# Patient Record
Sex: MALE | Race: WHITE | NOT HISPANIC OR LATINO | Employment: OTHER | URBAN - METROPOLITAN AREA
[De-identification: names, ages, dates, MRNs, and addresses within clinical notes are randomized per-mention and may not be internally consistent; named-entity substitution may affect disease eponyms.]

---

## 2023-04-04 DIAGNOSIS — E11.9 TYPE 2 DIABETES MELLITUS WITHOUT COMPLICATION, WITHOUT LONG-TERM CURRENT USE OF INSULIN (MULTI): Primary | ICD-10-CM

## 2023-04-04 NOTE — TELEPHONE ENCOUNTER
Pt called Rx line @ 346 on 4/3 asking for a refill on pended Rx  Need to call pt and get clarification on where med needs sent, pt stated a [facility] but I cannot find in chart. Thanks, AM

## 2023-04-11 ENCOUNTER — TELEPHONE (OUTPATIENT)
Dept: PRIMARY CARE | Facility: CLINIC | Age: 78
End: 2023-04-11
Payer: MEDICARE

## 2023-04-11 NOTE — TELEPHONE ENCOUNTER
Pt told pharmacy he lost his Jardiance 25 MG rx... pharmacy told him to refill rx they need a verbal order to do so. Pt will not call [facility] to refill rx.    P: [phone]

## 2023-04-11 NOTE — TELEPHONE ENCOUNTER
Called [facility] - on hold for 15 mins... had to hang up.. this is exactly why we do not do this.... JOHANNA

## 2023-04-30 LAB — SARS-COV-2 RESULT: NOT DETECTED

## 2023-05-01 ENCOUNTER — HOSPITAL ENCOUNTER (OUTPATIENT)
Dept: DATA CONVERSION | Facility: HOSPITAL | Age: 78
Discharge: HOME | End: 2023-05-02
Attending: STUDENT IN AN ORGANIZED HEALTH CARE EDUCATION/TRAINING PROGRAM | Admitting: STUDENT IN AN ORGANIZED HEALTH CARE EDUCATION/TRAINING PROGRAM

## 2023-05-01 DIAGNOSIS — Z79.4 LONG TERM (CURRENT) USE OF INSULIN (MULTI): ICD-10-CM

## 2023-05-01 DIAGNOSIS — E78.5 HYPERLIPIDEMIA, UNSPECIFIED: ICD-10-CM

## 2023-05-01 DIAGNOSIS — M19.011 PRIMARY OSTEOARTHRITIS, RIGHT SHOULDER: ICD-10-CM

## 2023-05-01 DIAGNOSIS — E11.9 TYPE 2 DIABETES MELLITUS WITHOUT COMPLICATIONS (MULTI): ICD-10-CM

## 2023-05-01 DIAGNOSIS — Z79.82 LONG TERM (CURRENT) USE OF ASPIRIN: ICD-10-CM

## 2023-05-01 DIAGNOSIS — M25.711 OSTEOPHYTE, RIGHT SHOULDER: ICD-10-CM

## 2023-05-01 DIAGNOSIS — Z79.84 LONG TERM (CURRENT) USE OF ORAL HYPOGLYCEMIC DRUGS: ICD-10-CM

## 2023-05-01 DIAGNOSIS — I10 ESSENTIAL (PRIMARY) HYPERTENSION: ICD-10-CM

## 2023-05-01 DIAGNOSIS — M25.511 PAIN IN RIGHT SHOULDER: ICD-10-CM

## 2023-05-01 LAB
POCT GLUCOSE: 104 MG/DL (ref 74–99)
POCT GLUCOSE: 114 MG/DL (ref 74–99)
POCT GLUCOSE: 154 MG/DL (ref 74–99)
POCT GLUCOSE: 251 MG/DL (ref 74–99)

## 2023-05-02 LAB
ANION GAP IN SER/PLAS: 15 MMOL/L (ref 10–20)
CALCIUM (MG/DL) IN SER/PLAS: 8.5 MG/DL (ref 8.6–10.3)
CARBON DIOXIDE, TOTAL (MMOL/L) IN SER/PLAS: 23 MMOL/L (ref 21–32)
CHLORIDE (MMOL/L) IN SER/PLAS: 102 MMOL/L (ref 98–107)
CREATININE (MG/DL) IN SER/PLAS: 1.2 MG/DL (ref 0.5–1.3)
ERYTHROCYTE DISTRIBUTION WIDTH (RATIO) BY AUTOMATED COUNT: 13.2 % (ref 11.5–14.5)
ERYTHROCYTE MEAN CORPUSCULAR HEMOGLOBIN CONCENTRATION (G/DL) BY AUTOMATED: 32.6 G/DL (ref 32–36)
ERYTHROCYTE MEAN CORPUSCULAR VOLUME (FL) BY AUTOMATED COUNT: 97 FL (ref 80–100)
ERYTHROCYTES (10*6/UL) IN BLOOD BY AUTOMATED COUNT: 3.99 X10E12/L (ref 4.5–5.9)
GFR MALE: 62 ML/MIN/1.73M2
GLUCOSE (MG/DL) IN SER/PLAS: 196 MG/DL (ref 74–99)
HEMATOCRIT (%) IN BLOOD BY AUTOMATED COUNT: 38.6 % (ref 41–52)
HEMOGLOBIN (G/DL) IN BLOOD: 12.6 G/DL (ref 13.5–17.5)
LEUKOCYTES (10*3/UL) IN BLOOD BY AUTOMATED COUNT: 10 X10E9/L (ref 4.4–11.3)
PLATELETS (10*3/UL) IN BLOOD AUTOMATED COUNT: 207 X10E9/L (ref 150–450)
POCT GLUCOSE: 212 MG/DL (ref 74–99)
POTASSIUM (MMOL/L) IN SER/PLAS: 3.4 MMOL/L (ref 3.5–5.3)
SODIUM (MMOL/L) IN SER/PLAS: 137 MMOL/L (ref 136–145)
UREA NITROGEN (MG/DL) IN SER/PLAS: 31 MG/DL (ref 6–23)

## 2023-05-05 DIAGNOSIS — Z79.4 TYPE 2 DIABETES MELLITUS WITH HYPERGLYCEMIA, WITH LONG-TERM CURRENT USE OF INSULIN (MULTI): Primary | ICD-10-CM

## 2023-05-05 DIAGNOSIS — E11.65 TYPE 2 DIABETES MELLITUS WITH HYPERGLYCEMIA, WITH LONG-TERM CURRENT USE OF INSULIN (MULTI): Primary | ICD-10-CM

## 2023-05-05 RX ORDER — PEN NEEDLE, DIABETIC 29 G X1/2"
NEEDLE, DISPOSABLE MISCELLANEOUS
COMMUNITY
Start: 2021-02-17 | End: 2023-05-05 | Stop reason: SDUPTHER

## 2023-05-06 RX ORDER — PEN NEEDLE, DIABETIC 29 G X1/2"
NEEDLE, DISPOSABLE MISCELLANEOUS
Qty: 100 EACH | Refills: 3 | Status: SHIPPED | OUTPATIENT
Start: 2023-05-06 | End: 2023-07-17 | Stop reason: SDUPTHER

## 2023-05-09 ENCOUNTER — APPOINTMENT (OUTPATIENT)
Dept: PRIMARY CARE | Facility: CLINIC | Age: 78
End: 2023-05-09
Payer: MEDICARE

## 2023-05-23 ENCOUNTER — OFFICE VISIT (OUTPATIENT)
Dept: PRIMARY CARE | Facility: CLINIC | Age: 78
End: 2023-05-23
Payer: MEDICARE

## 2023-05-23 VITALS
DIASTOLIC BLOOD PRESSURE: 80 MMHG | TEMPERATURE: 97.1 F | SYSTOLIC BLOOD PRESSURE: 136 MMHG | WEIGHT: 195 LBS | HEART RATE: 76 BPM | HEIGHT: 63 IN | BODY MASS INDEX: 34.55 KG/M2 | OXYGEN SATURATION: 98 %

## 2023-05-23 DIAGNOSIS — I10 PRIMARY HYPERTENSION: ICD-10-CM

## 2023-05-23 DIAGNOSIS — E78.2 MIXED HYPERLIPIDEMIA: ICD-10-CM

## 2023-05-23 DIAGNOSIS — Z00.00 ROUTINE ADULT HEALTH MAINTENANCE: ICD-10-CM

## 2023-05-23 DIAGNOSIS — E11.9 TYPE 2 DIABETES MELLITUS WITHOUT COMPLICATION, WITHOUT LONG-TERM CURRENT USE OF INSULIN (MULTI): ICD-10-CM

## 2023-05-23 DIAGNOSIS — F33.42 RECURRENT MAJOR DEPRESSIVE DISORDER, IN FULL REMISSION (CMS-HCC): ICD-10-CM

## 2023-05-23 DIAGNOSIS — E11.9 DIABETES MELLITUS WITHOUT COMPLICATION (MULTI): ICD-10-CM

## 2023-05-23 DIAGNOSIS — Z00.00 MEDICARE ANNUAL WELLNESS VISIT, SUBSEQUENT: Primary | ICD-10-CM

## 2023-05-23 PROCEDURE — 3075F SYST BP GE 130 - 139MM HG: CPT | Performed by: FAMILY MEDICINE

## 2023-05-23 PROCEDURE — 3079F DIAST BP 80-89 MM HG: CPT | Performed by: FAMILY MEDICINE

## 2023-05-23 PROCEDURE — 1036F TOBACCO NON-USER: CPT | Performed by: FAMILY MEDICINE

## 2023-05-23 PROCEDURE — G0439 PPPS, SUBSEQ VISIT: HCPCS | Performed by: FAMILY MEDICINE

## 2023-05-23 PROCEDURE — 1159F MED LIST DOCD IN RCRD: CPT | Performed by: FAMILY MEDICINE

## 2023-05-23 PROCEDURE — 99214 OFFICE O/P EST MOD 30 MIN: CPT | Performed by: FAMILY MEDICINE

## 2023-05-23 PROCEDURE — 1170F FXNL STATUS ASSESSED: CPT | Performed by: FAMILY MEDICINE

## 2023-05-23 PROCEDURE — 1160F RVW MEDS BY RX/DR IN RCRD: CPT | Performed by: FAMILY MEDICINE

## 2023-05-23 RX ORDER — LOSARTAN POTASSIUM AND HYDROCHLOROTHIAZIDE 25; 100 MG/1; MG/1
TABLET ORAL
COMMUNITY
Start: 2011-03-17 | End: 2023-05-23 | Stop reason: SDUPTHER

## 2023-05-23 RX ORDER — METFORMIN HYDROCHLORIDE 500 MG/1
500 TABLET ORAL
Qty: 180 TABLET | Refills: 3 | Status: SHIPPED | OUTPATIENT
Start: 2023-05-23 | End: 2023-07-18 | Stop reason: SDUPTHER

## 2023-05-23 RX ORDER — LATANOPROST 50 UG/ML
SOLUTION/ DROPS OPHTHALMIC
COMMUNITY
Start: 2022-02-17

## 2023-05-23 RX ORDER — LOVASTATIN 40 MG/1
TABLET ORAL
COMMUNITY
Start: 2008-11-07 | End: 2023-05-23 | Stop reason: SDUPTHER

## 2023-05-23 RX ORDER — LOVASTATIN 40 MG/1
40 TABLET ORAL NIGHTLY
Qty: 90 TABLET | Refills: 3 | Status: SHIPPED | OUTPATIENT
Start: 2023-05-23 | End: 2023-12-18 | Stop reason: SDUPTHER

## 2023-05-23 RX ORDER — POTASSIUM CHLORIDE 750 MG/1
CAPSULE, EXTENDED RELEASE ORAL
COMMUNITY
Start: 2021-08-05 | End: 2023-05-23 | Stop reason: SDUPTHER

## 2023-05-23 RX ORDER — LOSARTAN POTASSIUM AND HYDROCHLOROTHIAZIDE 25; 100 MG/1; MG/1
1 TABLET ORAL DAILY
Qty: 90 TABLET | Refills: 3 | Status: SHIPPED | OUTPATIENT
Start: 2023-05-23 | End: 2023-08-17 | Stop reason: SDUPTHER

## 2023-05-23 RX ORDER — AMLODIPINE BESYLATE 10 MG/1
10 TABLET ORAL DAILY
Qty: 90 TABLET | Refills: 3 | Status: SHIPPED | OUTPATIENT
Start: 2023-05-23 | End: 2023-07-18 | Stop reason: SDUPTHER

## 2023-05-23 RX ORDER — METFORMIN HYDROCHLORIDE 500 MG/1
TABLET ORAL
COMMUNITY
Start: 2016-02-11 | End: 2023-05-23 | Stop reason: SDUPTHER

## 2023-05-23 RX ORDER — METOPROLOL TARTRATE 50 MG/1
50 TABLET ORAL 2 TIMES DAILY
Qty: 180 TABLET | Refills: 3 | Status: SHIPPED | OUTPATIENT
Start: 2023-05-23 | End: 2023-07-18 | Stop reason: SDUPTHER

## 2023-05-23 RX ORDER — AMLODIPINE BESYLATE 10 MG/1
TABLET ORAL
COMMUNITY
Start: 2013-12-06 | End: 2023-05-23 | Stop reason: SDUPTHER

## 2023-05-23 RX ORDER — TIMOLOL MALEATE 6.8 MG/ML
SOLUTION/ DROPS OPHTHALMIC
COMMUNITY
Start: 2023-01-10

## 2023-05-23 RX ORDER — DULOXETIN HYDROCHLORIDE 60 MG/1
60 CAPSULE, DELAYED RELEASE ORAL DAILY
Qty: 90 CAPSULE | Refills: 3 | Status: SHIPPED | OUTPATIENT
Start: 2023-05-23 | End: 2023-11-08 | Stop reason: SDUPTHER

## 2023-05-23 RX ORDER — PEN NEEDLE, DIABETIC 32GX 5/32"
NEEDLE, DISPOSABLE MISCELLANEOUS
COMMUNITY
Start: 2023-05-08 | End: 2024-04-30

## 2023-05-23 RX ORDER — DULOXETIN HYDROCHLORIDE 60 MG/1
CAPSULE, DELAYED RELEASE ORAL
COMMUNITY
Start: 2017-06-22 | End: 2023-05-23 | Stop reason: SDUPTHER

## 2023-05-23 RX ORDER — POTASSIUM CHLORIDE 750 MG/1
10 CAPSULE, EXTENDED RELEASE ORAL DAILY
Qty: 90 CAPSULE | Refills: 3 | Status: SHIPPED | OUTPATIENT
Start: 2023-05-23 | End: 2024-05-16 | Stop reason: SDUPTHER

## 2023-05-23 RX ORDER — BUPROPION HYDROCHLORIDE 150 MG/1
150 TABLET ORAL EVERY MORNING
Qty: 90 TABLET | Refills: 1 | Status: SHIPPED | OUTPATIENT
Start: 2023-05-23 | End: 2023-07-22

## 2023-05-23 RX ORDER — METOPROLOL TARTRATE 50 MG/1
TABLET ORAL
COMMUNITY
Start: 2013-12-06 | End: 2023-05-23 | Stop reason: SDUPTHER

## 2023-05-23 RX ORDER — FAMOTIDINE 20 MG/1
TABLET, FILM COATED ORAL
COMMUNITY
Start: 2020-02-06

## 2023-05-23 RX ORDER — HUMAN INSULIN 100 [IU]/ML
INJECTION, SUSPENSION SUBCUTANEOUS
COMMUNITY
End: 2023-07-10 | Stop reason: SDUPTHER

## 2023-05-23 ASSESSMENT — PATIENT HEALTH QUESTIONNAIRE - PHQ9
1. LITTLE INTEREST OR PLEASURE IN DOING THINGS: SEVERAL DAYS
10. IF YOU CHECKED OFF ANY PROBLEMS, HOW DIFFICULT HAVE THESE PROBLEMS MADE IT FOR YOU TO DO YOUR WORK, TAKE CARE OF THINGS AT HOME, OR GET ALONG WITH OTHER PEOPLE: NOT DIFFICULT AT ALL
SUM OF ALL RESPONSES TO PHQ9 QUESTIONS 1 AND 2: 2
2. FEELING DOWN, DEPRESSED OR HOPELESS: SEVERAL DAYS

## 2023-05-23 ASSESSMENT — ACTIVITIES OF DAILY LIVING (ADL)
GROCERY_SHOPPING: INDEPENDENT
DOING_HOUSEWORK: INDEPENDENT
BATHING: INDEPENDENT
MANAGING_FINANCES: INDEPENDENT
TAKING_MEDICATION: INDEPENDENT
DRESSING: INDEPENDENT

## 2023-05-23 NOTE — PROGRESS NOTES
Subjective   Patient ID: Saturnino Escobedo is a 77 y.o. male who presents for Medicare Annual Wellness Visit Subsequent (Recheck dm/Review bw).  HPI  HPI: Annual Wellness visit. The patient has felt depressed over the past 6 weeks. No trouble with bathing, dressing, eating, grooming, walking, toileting, house work, managing money or transportation. Reports no falls. The home has functioning smoke alarms, grab bars in the bathroom and handrails on the stairs. The home does not have poor lighting. Reports hearing difficulty in the ears bilaterally. Currently following a diabetic/low calorie diet.   Has had some urinary incontinence.    1. lipids: well controlled    2. anxiety: doing well overall.  Feeling more depressed and disappointed.    3. HTN: weight is good, stable    4. DM2: Trying to watch his diet.  A1c was 6.7 in April.    5. OA: stable      Counseled pt on living will: has living will    AAA screening: NA     Prostate CA screen:     CV screening: checking CA score    Colonoscopy: done in 2021 and normal    Diabetes screening: treated    Glaucoma screen:  sees optho    CT chest tob screen: NA    Vaccines:      Current Outpatient Medications on File Prior to Visit   Medication Sig Dispense Refill    amLODIPine (Norvasc) 10 mg tablet Take by mouth.      aspirin 81 mg capsule Take by mouth.      DULoxetine (Cymbalta) 60 mg DR capsule Take by mouth.      empagliflozin (Jardiance) 25 mg Take 1 tablet (25 mg) by mouth once daily. 90 tablet 3    fish oil concentrate (Omega-3) 120-180 mg capsule       latanoprost (Xalatan) 0.005 % ophthalmic solution Administer into affected eye(s).      losartan-hydrochlorothiazide (Hyzaar) 100-25 mg tablet Take by mouth.      lovastatin (Mevacor) 40 mg tablet Take by mouth.      metFORMIN (Glucophage) 500 mg tablet Take by mouth.      metoprolol tartrate (Lopressor) 50 mg tablet Take by mouth.      NovoLIN N FlexPen 100 unit/mL (3 mL) injection INJECT 20 UNITS SUBCUTANEOUSLY TWICE  "DAILY      pen needle, diabetic 31 gauge x 1/4\" needle To use daily 100 each 3    timoloL maleate 0.5 % drops, once daily Administer into affected eye(s).      famotidine (Pepcid) 20 mg tablet Take by mouth.      pen needle, diabetic 31 gauge x 15/64\" needle USE AS DIRECTED DAILY      potassium chloride ER (Micro-K) 10 mEq ER capsule Take by mouth.       No current facility-administered medications on file prior to visit.        Vitals:    05/23/23 1010   BP: 136/80   Pulse: 76   Temp: 36.2 °C (97.1 °F)   SpO2: 98%       Review of Systems   All other systems reviewed and are negative.      Objective     Physical Exam  Vitals reviewed.   Constitutional:       General: He is not in acute distress.     Appearance: Normal appearance. He is well-developed. He is not diaphoretic.   HENT:      Head: Normocephalic and atraumatic.      Right Ear: Tympanic membrane normal.      Left Ear: Tympanic membrane normal.      Nose: Nose normal.      Mouth/Throat:      Mouth: Mucous membranes are moist.   Eyes:      Pupils: Pupils are equal, round, and reactive to light.   Cardiovascular:      Rate and Rhythm: Normal rate and regular rhythm.      Heart sounds: Normal heart sounds. No murmur heard.     No friction rub. No gallop.   Pulmonary:      Effort: Pulmonary effort is normal.      Breath sounds: Normal breath sounds. No rales.   Abdominal:      General: Bowel sounds are normal.      Palpations: Abdomen is soft.      Tenderness: There is no abdominal tenderness.   Musculoskeletal:      Cervical back: Normal range of motion and neck supple.   Skin:     General: Skin is warm and dry.   Neurological:      Mental Status: He is alert.   Psychiatric:         Mood and Affect: Mood normal.         Orders Only on 04/22/2023   Component Date Value Ref Range Status    SARS-COV-2 RESULT 04/29/2023 NOT DETECTED  Not Detected Final    Comment: .  This assay is designed to detect the ORF1a/b and E genes of SARS-CoV-2 via   nucleic acid " amplification. A Not Detected result does not preclude   2019-nCoV infection since the adequacy of sample collection and/or low viral   burden may result in presence of viral nucleic acids below the clinical   sensitivity of this test method.     Fact sheet for providers: https://www.fda.gov/media/789622/download   Fact sheet for patients: https://www.fda.gov/media/684011/download     This test has received FDA Emergency Use Authorization (EUA) and has been   verified for use by [facility] ([facility]).   This test is only authorized for the duration of time that circumstances   exist to justify the authorization of the emergency use of in vitro   diagnostic tests for the detection of SARS-CoV-2 virus and/or diagnosis of   COVID-19 infection under section 564(b)(1) of the Act, 21 U.S.C.   360bbb-3(b)(1), unless the authorization is terminated or revoked sooner.                               [facility] is certified under CLIA-88 as   qualified to perform high complexity testing. Testing is performed in the   [facility] laboratories located at [address] [place] [address].     Legacy Encounter on 04/21/2023   Component Date Value Ref Range Status    Glucose 04/21/2023 108 (H)  74 - 99 mg/dL Final    Sodium 04/21/2023 138  136 - 145 mmol/L Final    Potassium 04/21/2023 3.9  3.5 - 5.3 mmol/L Final    Chloride 04/21/2023 101  98 - 107 mmol/L Final    Bicarbonate 04/21/2023 27  21 - 32 mmol/L Final    Anion Gap 04/21/2023 14  10 - 20 mmol/L Final    Urea Nitrogen 04/21/2023 26 (H)  6 - 23 mg/dL Final    Creatinine 04/21/2023 1.27  0.50 - 1.30 mg/dL Final    GFR MALE 04/21/2023 58 (A)  >90 mL/min/1.73m2 Final    Comment:  CALCULATIONS OF ESTIMATED GFR ARE PERFORMED   USING THE 2021 CKD-EPI STUDY REFIT EQUATION   WITHOUT THE RACE VARIABLE FOR THE IDMS-TRACEABLE   CREATININE METHODS.    https://jasn.asnjournals.org/content/early/2021/09/22/ASN.6501112160       Calcium 04/21/2023 9.2  8.6 - 10.3 mg/dL Final    Hemoglobin A1C 04/21/2023 6.7 (A)  % Final    Comment:      Diagnosis of Diabetes-Adults   Non-Diabetic: < or = 5.6%   Increased risk for developing diabetes: 5.7-6.4%   Diagnostic of diabetes: > or = 6.5%  .       Monitoring of Diabetes                Age (y)     Therapeutic Goal (%)   Adults:          >18           <7.0   Pediatrics:    13-18           <7.5                   7-12           <8.0                   0- 6            7.5-8.5   American Diabetes Association. Diabetes Care 33(S1), Jan 2010.      Estimated Average Glucose 04/21/2023 146  MG/DL Final    WBC 04/21/2023 7.0  4.4 - 11.3 x10E9/L Final    RBC 04/21/2023 4.62  4.50 - 5.90 x10E12/L Final    Hemoglobin 04/21/2023 14.8  13.5 - 17.5 g/dL Final    Hematocrit 04/21/2023 45.1  41.0 - 52.0 % Final    MCV 04/21/2023 98  80 - 100 fL Final    MCHC 04/21/2023 32.8  32.0 - 36.0 g/dL Final    Platelets 04/21/2023 233  150 - 450 x10E9/L Final    RDW 04/21/2023 13.0  11.5 - 14.5 % Final    Neutrophils % 04/21/2023 52.5  40.0 - 80.0 % Final    Immature Granulocytes %, Automated 04/21/2023 0.1  0.0 - 0.9 % Final    Comment:  Immature Granulocyte Count (IG) includes promyelocytes,    myelocytes and metamyelocytes but does not include bands.   Percent differential counts (%) should be interpreted in the   context of the absolute cell counts (cells/L).      Lymphocytes % 04/21/2023 34.9  13.0 - 44.0 % Final    Monocytes % 04/21/2023 10.8  2.0 - 10.0 % Final    Eosinophils % 04/21/2023 1.1  0.0 - 6.0 % Final    Basophils % 04/21/2023 0.6  0.0 - 2.0 % Final    Neutrophils Absolute 04/21/2023 3.69  1.60 - 5.50 x10E9/L Final    Lymphocytes Absolute 04/21/2023 2.46  0.80 - 3.00 x10E9/L Final    Monocytes Absolute 04/21/2023 0.76  0.05 - 0.80 x10E9/L Final    Eosinophils Absolute 04/21/2023 0.08  0.00 - 0.40 x10E9/L Final    Basophils Absolute 04/21/2023 0.04  0.00 - 0.10 x10E9/L Final    Staph/MRSA Screen Culture  04/21/2023 NO Staphylococcus aureus ISOLATED.   Final    Ventricular Rate 04/21/2023 57  BPM Final    Atrial Rate 04/21/2023 57  BPM Final    AK Interval 04/21/2023 166  ms Final    QRS Duration 04/21/2023 100  ms Final    QT Interval 04/21/2023 456  ms Final    QTC Calculation(Bazett) 04/21/2023 443  ms Final    P Axis 04/21/2023 5  degrees Final    R Axis 04/21/2023 -26  degrees Final    T Axis 04/21/2023 19  degrees Final    QRS Count 04/21/2023 9  beats Final    Q Onset 04/21/2023 222  ms Final    P Onset 04/21/2023 139  ms Final    P Offset 04/21/2023 201  ms Final    T Offset 04/21/2023 450  ms Final    QTC Fredericia 04/21/2023 448  ms Final       Assessment/Plan   Problem List Items Addressed This Visit    None  Visit Diagnoses       Medicare annual wellness visit, subsequent    -  Primary    Routine adult health maintenance        Diabetes mellitus without complication (CMS/HCC)        Primary hypertension        Mixed hyperlipidemia        Recurrent major depressive disorder, in full remission (CMS/HCC)              Doing well.  Refilled meds.  Follow up in 4 mo.

## 2023-05-26 ENCOUNTER — TELEPHONE (OUTPATIENT)
Dept: PRIMARY CARE | Facility: CLINIC | Age: 78
End: 2023-05-26
Payer: MEDICARE

## 2023-05-26 NOTE — TELEPHONE ENCOUNTER
Pt wants it put in his chart that [facility] uses [facility] all his [facility] only RX that doesn't use [facility] is Jardiance

## 2023-07-10 DIAGNOSIS — Z79.4 CONTROLLED TYPE 2 DIABETES MELLITUS WITH HYPERGLYCEMIA, WITH LONG-TERM CURRENT USE OF INSULIN (MULTI): Primary | ICD-10-CM

## 2023-07-10 DIAGNOSIS — E11.65 CONTROLLED TYPE 2 DIABETES MELLITUS WITH HYPERGLYCEMIA, WITH LONG-TERM CURRENT USE OF INSULIN (MULTI): Primary | ICD-10-CM

## 2023-07-10 RX ORDER — HUMAN INSULIN 100 [IU]/ML
20 INJECTION, SUSPENSION SUBCUTANEOUS
Qty: 36 ML | Refills: 3 | Status: SHIPPED | OUTPATIENT
Start: 2023-07-10 | End: 2023-07-12 | Stop reason: ALTCHOICE

## 2023-07-12 ENCOUNTER — TELEPHONE (OUTPATIENT)
Dept: PRIMARY CARE | Facility: CLINIC | Age: 78
End: 2023-07-12
Payer: MEDICARE

## 2023-07-12 DIAGNOSIS — Z79.4 CONTROLLED TYPE 2 DIABETES MELLITUS WITH HYPERGLYCEMIA, WITH LONG-TERM CURRENT USE OF INSULIN (MULTI): Primary | ICD-10-CM

## 2023-07-12 DIAGNOSIS — E11.65 CONTROLLED TYPE 2 DIABETES MELLITUS WITH HYPERGLYCEMIA, WITH LONG-TERM CURRENT USE OF INSULIN (MULTI): Primary | ICD-10-CM

## 2023-07-12 RX ORDER — NAPROXEN SODIUM 220 MG
TABLET ORAL
Qty: 200 EACH | Refills: 3 | Status: SHIPPED | OUTPATIENT
Start: 2023-07-12 | End: 2024-04-16 | Stop reason: SDUPTHER

## 2023-07-12 NOTE — TELEPHONE ENCOUNTER
Pt stated that he needs the generic of Novolin sent into [facility] d/t the pice of Novolin being doubled. Can we send in generic version and pt wants called after rx is sent in

## 2023-07-17 ENCOUNTER — TELEPHONE (OUTPATIENT)
Dept: PRIMARY CARE | Facility: CLINIC | Age: 78
End: 2023-07-17
Payer: MEDICARE

## 2023-07-17 DIAGNOSIS — E78.2 MIXED HYPERLIPIDEMIA: ICD-10-CM

## 2023-07-17 DIAGNOSIS — E11.65 CONTROLLED TYPE 2 DIABETES MELLITUS WITH HYPERGLYCEMIA, WITH LONG-TERM CURRENT USE OF INSULIN (MULTI): ICD-10-CM

## 2023-07-17 DIAGNOSIS — E11.65 TYPE 2 DIABETES MELLITUS WITH HYPERGLYCEMIA, WITH LONG-TERM CURRENT USE OF INSULIN (MULTI): ICD-10-CM

## 2023-07-17 DIAGNOSIS — I10 PRIMARY HYPERTENSION: ICD-10-CM

## 2023-07-17 DIAGNOSIS — Z79.4 CONTROLLED TYPE 2 DIABETES MELLITUS WITH HYPERGLYCEMIA, WITH LONG-TERM CURRENT USE OF INSULIN (MULTI): ICD-10-CM

## 2023-07-17 DIAGNOSIS — E11.9 DIABETES MELLITUS WITHOUT COMPLICATION (MULTI): ICD-10-CM

## 2023-07-17 DIAGNOSIS — Z79.4 TYPE 2 DIABETES MELLITUS WITH HYPERGLYCEMIA, WITH LONG-TERM CURRENT USE OF INSULIN (MULTI): ICD-10-CM

## 2023-07-17 RX ORDER — HUMAN INSULIN 100 [IU]/ML
20 INJECTION, SUSPENSION SUBCUTANEOUS
Qty: 36 ML | Refills: 1 | Status: SHIPPED | OUTPATIENT
Start: 2023-07-17 | End: 2023-07-23 | Stop reason: SDUPTHER

## 2023-07-17 RX ORDER — PEN NEEDLE, DIABETIC 29 G X1/2"
NEEDLE, DISPOSABLE MISCELLANEOUS
Qty: 200 EACH | Refills: 1 | Status: SHIPPED | OUTPATIENT
Start: 2023-07-17 | End: 2024-04-22 | Stop reason: SDUPTHER

## 2023-07-17 NOTE — TELEPHONE ENCOUNTER
Pt called stating he does not understand why something different was sent in since he always gets the generic flex pen and wants that sent into [facility] since he will be out.       Pt req generic Flex pen be sent can this be resent please. / mk

## 2023-07-17 NOTE — TELEPHONE ENCOUNTER
Pharmacy states pt usually does not get vials of insulin, asking if this was a mistake or changed? Can another provider address this?

## 2023-07-18 RX ORDER — METOPROLOL TARTRATE 50 MG/1
50 TABLET ORAL 2 TIMES DAILY
Qty: 180 TABLET | Refills: 3 | Status: SHIPPED | OUTPATIENT
Start: 2023-07-18 | End: 2024-07-17

## 2023-07-18 RX ORDER — AMLODIPINE BESYLATE 10 MG/1
10 TABLET ORAL DAILY
Qty: 90 TABLET | Refills: 3 | Status: SHIPPED | OUTPATIENT
Start: 2023-07-18 | End: 2024-07-17

## 2023-07-18 RX ORDER — LOVASTATIN 40 MG/1
TABLET ORAL
Qty: 90 TABLET | Refills: 3 | OUTPATIENT
Start: 2023-07-18

## 2023-07-18 RX ORDER — METFORMIN HYDROCHLORIDE 500 MG/1
500 TABLET ORAL
Qty: 180 TABLET | Refills: 3 | Status: SHIPPED | OUTPATIENT
Start: 2023-07-18 | End: 2024-07-17

## 2023-07-18 NOTE — TELEPHONE ENCOUNTER
Called and spoke with pt. These meds were sent to [facility] for 1 yr supply  These need to go through Mailorder. Pt needs these sent to [facility] today.  Please advise./JEISON

## 2023-07-18 NOTE — TELEPHONE ENCOUNTER
Pt called rx line at 157 pm stating he needed refills on Metoprolol, Metformin and Amlodipine.   All these RX were sent for 90 day supplies with 3 refills on 5/23/23 to [facility] and they confirmed that they received these. Need to reach out to patient to have them check with pharmacy. EVELYN

## 2023-07-23 RX ORDER — HUMAN INSULIN 100 [IU]/ML
20 INJECTION, SUSPENSION SUBCUTANEOUS
Qty: 36 ML | Refills: 1 | Status: SHIPPED | OUTPATIENT
Start: 2023-07-23 | End: 2023-10-24 | Stop reason: SDUPTHER

## 2023-08-17 DIAGNOSIS — I10 PRIMARY HYPERTENSION: ICD-10-CM

## 2023-08-17 RX ORDER — LOSARTAN POTASSIUM AND HYDROCHLOROTHIAZIDE 25; 100 MG/1; MG/1
1 TABLET ORAL DAILY
Qty: 90 TABLET | Refills: 3 | Status: CANCELLED | OUTPATIENT
Start: 2023-08-17 | End: 2024-08-16

## 2023-08-17 NOTE — TELEPHONE ENCOUNTER
Pt called Rx line requesting a refill on Losartan, pt is needing this to go to [facility] mail order pharmacy. Looks like med was sent to [facility] previously. Please advise, AM

## 2023-08-21 RX ORDER — LOSARTAN POTASSIUM AND HYDROCHLOROTHIAZIDE 25; 100 MG/1; MG/1
1 TABLET ORAL DAILY
Qty: 90 TABLET | Refills: 3 | Status: SHIPPED | OUTPATIENT
Start: 2023-08-21 | End: 2024-08-20

## 2023-08-25 PROBLEM — M25.511 RIGHT SHOULDER PAIN: Status: ACTIVE | Noted: 2023-08-25

## 2023-08-25 PROBLEM — E11.9 TYPE 2 DIABETES MELLITUS (MULTI): Status: ACTIVE | Noted: 2022-05-26

## 2023-08-25 PROBLEM — F41.1 ANXIETY STATE: Status: ACTIVE | Noted: 2019-10-10

## 2023-08-25 PROBLEM — M15.9 GENERALIZED OSTEOARTHRITIS: Status: ACTIVE | Noted: 2019-10-10

## 2023-08-25 PROBLEM — M19.011 OSTEOARTHRITIS OF RIGHT GLENOHUMERAL JOINT: Status: ACTIVE | Noted: 2023-08-25

## 2023-08-25 RX ORDER — BACLOFEN 10 MG/1
.5-1 TABLET ORAL 3 TIMES DAILY PRN
COMMUNITY
Start: 2016-06-16

## 2023-08-25 RX ORDER — OXYCODONE HYDROCHLORIDE 5 MG/1
TABLET ORAL
COMMUNITY
Start: 2023-05-01

## 2023-08-25 RX ORDER — CHLORHEXIDINE GLUCONATE ORAL RINSE 1.2 MG/ML
15 SOLUTION DENTAL
COMMUNITY
Start: 2023-04-21

## 2023-08-25 RX ORDER — ASPIRIN 81 MG/1
81 TABLET ORAL DAILY
COMMUNITY

## 2023-08-25 RX ORDER — FLASH GLUCOSE SCANNING READER
EACH MISCELLANEOUS
COMMUNITY
Start: 2021-02-17

## 2023-08-25 RX ORDER — NAPROXEN 250 MG/1
250 TABLET ORAL
COMMUNITY
Start: 2014-10-29

## 2023-08-25 RX ORDER — DORZOLAMIDE HYDROCHLORIDE AND TIMOLOL MALEATE 20; 5 MG/ML; MG/ML
SOLUTION/ DROPS OPHTHALMIC
COMMUNITY
Start: 2022-04-21

## 2023-08-25 RX ORDER — METOPROLOL SUCCINATE 50 MG/1
50 TABLET, EXTENDED RELEASE ORAL DAILY
COMMUNITY

## 2023-08-25 RX ORDER — NAPROXEN 500 MG/1
1 TABLET ORAL 2 TIMES DAILY
COMMUNITY
Start: 2022-06-20

## 2023-08-25 RX ORDER — AMOXICILLIN 500 MG/1
4 CAPSULE ORAL
COMMUNITY
Start: 2023-03-27

## 2023-09-07 VITALS — HEIGHT: 63 IN | WEIGHT: 187.61 LBS | BODY MASS INDEX: 33.24 KG/M2

## 2023-09-14 NOTE — DISCHARGE SUMMARY
Send Summary:   Discharge Summary Providers:  Provider Role Provider Name   · Attending Nick Escalera   · Referring Nick Escalera   · Primary Bud Escalera       Note Recipients: Bud Escalera MD - [id number] [PREFERRED]       Discharge:    Summary:   Admission Date: .01-May-2023 08:19:00   Discharge Date: 02-May-2023   Attending Physician at Discharge: Dr. Escalera   Admission Reason: Right glenohumeral OA   Final Discharge Diagnoses: Right glenohumeral OA   Procedures: Date: 01-May-2023 13:01:00  Procedure Name: 1. Right Shoulder Anatomic Total Replacement   Vital Signs:        T   P  R  BP   MAP  SpO2   Value  36.6  59  17  134/80      97%  Date/Time 5/2 8:08 5/2 8:08 5/2 8:08 5/2 8:08    5/2 8:08  Range  (36.1C - 36.6C )  (59 - 74 )  (17 - 18 )  (118 - 134 )/ (74 - 83 )    (93% - 97% )    Date:            Weight/Scale Type:  Height:   01-May-2023 14:21  85.1  kg / standing  159.7  cm  Physical Exam:    See daily progress note.  Hospital Course:    77 year-old male who presented with right shoulder arthritis. Patient is now s/p R anatomic shoulder arthroplasty on 5/1 by Dr. Escalera. On the day of surgery, patient  was identified in the pre-operative holding area and agreeable to proceed with surgery. Written consent was obtained.  Please see operative note for further details of this procedure. Patient received 24 hours of shaheed-operative antibiotics. Patient recovered  in the PACU before transfer to a regular nursing floor. Patient was started on oxycodone, tylenol, and morphine for pain control and ASA 81mg for DVT prophylaxis. Physical therapy recommended continued recovery in a home setting without needs. On the  day of discharge, patient was afebrile with stable vital signs. Patient was neurovascularly intact at time of discharge. Patient was discharged with prescription of ASA 81mg for DVT prophylaxis for 4 weeks. Patient will follow-up with Dr. Escalera in 2 weeks  for post-operative visit.      Discharge  Information:    and Continuing Care:   Lab Results - Pending:    None  Radiology Results - Pending: None   Discharge Instructions:    Activity:           activity with assistance.          May shower..            In sling, no active motion    Nutrition/Diet:           resume normal diet    Wound Care:           Wound Site:   Right shoulder          Wound Type:   surgical incision          Change Dressing:   Keep surgical dressing in place for 7 days, ok to remove after 7 days and leave open to air if no drainage    Additional Orders:           Additional Instructions:   MEDICATION SIDE EFFECTS.  OXYCODONE: constipation, nausea, vomiting, upset stomach, (sleepiness), dizziness, lightheadedness, itching, headache, blurred vision, dry mouth, sweating  TRAMADOL: headache, dizziness, drowsiness, tired feeling; constipation, diarrhea, nausea, vomiting, stomach pain; feeling nervous or anxious, itching, sweating, flushing.  ASPIRIN:  upset stomach, heartburn; drowsiness; or headache    Call if any drainage after 7 days, increased redness/warmth/swelling at incision site, pain/tenderness of calf, swelling of calf that does not respond to elevation, SOB/chest pain.          Follow Up Appointments:    Follow-Up Appointment 01:           Physician/Dept/Service:   Dr. Escalera          Call to Schedule in:   2 weeks          Phone Number:   [phone]    Discharge Medications: Home Medication   acetaminophen 500 mg oral tablet - 1 tab(s) orally every 6 hours -.Meds to Beds   Colace 100 mg oral capsule - 1 cap(s) orally 2 times a day -.Meds to Beds   oxyCODONE 5 mg oral tablet - 1 tab(s) orally every 6 hours -.Meds to Beds  as needed for pain.   Aspirin Enteric Coated 81 mg oral delayed release tablet - 1 tab(s) orally once a day -.Meds to Beds   NovoLIN 70/30 FlexPen subcutaneous suspension - subcutaneous 2 times a day  Jardiance 25 mg oral tablet - 1 tab(s) orally once a day (in the morning)  latanoprost 0.005% ophthalmic  solution - 1 drop(s) to each affected eye once a day (in the evening)  DULoxetine 60 mg oral delayed release capsule - 1 cap(s) orally once a day  metFORMIN 500 mg oral tablet - 1 tab(s) orally 2 times a day  amLODIPine 10 mg oral tablet - 1 tab(s) orally once a day  metoprolol tartrate 50 mg oral tablet - 1 tab(s) orally 2 times a day  losartan-hydroCHLOROthiazide 100 mg-25 mg oral tablet - 1 tab(s) orally once a day  lovastatin 40 mg oral tablet, extended release - 1 tab(s) orally once a day (at bedtime)  omega 3 - 1   once a day  100mg     PRN Medication     DNR Status:   ·  Code Status Code Status order at time of discharge: Full Code       Electronic Signatures:  Jenna Bernabe (APRN-CNP)  (Signed 02-May-2023 11:17)   Authored: Send Summary, Summary Content, Ongoing Care,  DNR Status, Note Completion      Last Updated: 02-May-2023 11:17 by Jenna Bernabe (APRN-CNP)

## 2023-09-14 NOTE — H&P
History & Physical Reviewed:   I have reviewed the History and Physical dated:  21-Apr-2023   History and Physical reviewed and relevant findings noted. Patient examined to review pertinent physical  findings.: No significant changes   Home Medications Reviewed: no changes noted   Allergies Reviewed: no changes noted       ERAS (Enhanced Recovery After Surgery):  ·  ERAS Patient: no     Consent:   COVID-19 Consent:  ·  COVID-19 Risk Consent Surgeon has reviewed key risks related to the risk of jefferson COVID-19 and if they contract COVID-19 what the risks are.     Attestation:   Note Completion:  I am a:  Resident/Fellow   Attending Attestation I saw and evaluated the patient.  I personally obtained the key and critical portions of the history and physical exam or was physically present for key and  critical portions performed by the resident/fellow. I reviewed the resident/fellow?s documentation and discussed the patient with the resident/fellow.  I agree with the resident/fellow?s medical decision making as documented in the note.     I personally evaluated the patient on 01-May-2023         Electronic Signatures:  Nick Escalera)  (Signed 01-May-2023 09:54)   Authored: Note Completion   Co-Signer: History & Physical Reviewed, ERAS, Consent, Note Completion  Haase, Lucas (MD (Resident))  (Signed 01-May-2023 09:29)   Authored: History & Physical Reviewed, ERAS, Consent,  Note Completion      Last Updated: 01-May-2023 09:54 by Nick Escalera)

## 2023-09-14 NOTE — PROGRESS NOTES
Service: Orthopaedics     Subjective Data:   EUGENE SERVIN is a 77 year old Male who is Hospital Day # 2 and POD #1 for 1. Right Shoulder Anatomic Total Replacement;     No acute events overnight. Patient states his pain is well controlled. Is eager to get home. States he has been OOB and walking. Tolerating a Diabetic diet without N/V. States he is passing gas but denies  having a BM. Denies fever, chills, SOB, and CP.    Objective Data:     Objective Information:      T   P  R  BP   MAP  SpO2   Value  36.6  59  17  134/80      97%  Date/Time 5/2 8:08 5/2 8:08 5/2 8:08 5/2 8:08    5/2 8:08  Range  (36.1C - 36.6C )  (59 - 74 )  (17 - 18 )  (118 - 134 )/ (74 - 83 )    (93% - 97% )      Pain reported at 5/2 4:00: sleeping    ---- Intake and Output  -----  Mn/Dy/Year Time  Intake   Output  Net  May 2, 2023 6:00 am  400   0  400  May 1, 2023 10:00 pm  0   0  0  May 1, 2023 2:00 pm  944   50  894    The Intake and Output Totals for the last 24 hours are:      Intake   Output  Net      1344   50  1294    Physical Exam by System     Constitutional: Awake/alert/oriented x3, no distress,  alert and cooperative.   Eyes: EOMI, clear sclera.   ENMT: Mucous membranes moist, no apparent injury.   Head/Neck: Neck supple, no apparent injury.   Respiratory/Thorax: Patent airways, CTAB, normal  breath sounds with good chest expansion, thorax symmetric.   Cardiovascular: Regular, rate and rhythm, normal  S1 and S2.   Gastrointestinal: Nondistended, soft, non-tender,  no rebound tenderness or guarding, +BS.   Genitourinary: Voiding independently without difficulty.   Musculoskeletal: ROM intact in left arm. ROM limited  in right arm 2/2 recent shoulder replacement. Right arm/shoulder with sling in place. Neurovascularly intact without numbness/tingling. Strong right radial pulse. Able to move right fingers without difficulty. Surgical dressing on right shoulder without  any shadowing.   Extremities: Moves all extremities. ROM  limited in  RUE 2/2 right shoulder replacement.   Neurological: alert and oriented x3, no focal deficits.   Psychological: Appropriate mood and behavior.   Skin: Warm and dry.     Medication     Medications:          Continuous Medications       --------------------------------    1. Lactated Ringers Infusion:  1000  mL  IntraVenous  <Continuous>         Scheduled Medications       --------------------------------    1. Acetaminophen:  650  mg  Oral  Every 6 Hours    2. amLODIPine (NORVASC):  10  mg  Oral  Daily    3. Aspirin Enteric Coated:  81  mg  Oral  2 Times a Day    4. Docusate:  100  mg  Oral  2 Times a Day    5. DULoxetine:  60  mg  Oral  Daily    6. Insulin Lispro Mild Corrective Scale:  unit(s)  SubCutaneous  3 Times a Day Before Meals    7. Ketorolac Injectable:  30  mg  IntraVenous Push  Every 6 Hours    8. Metoprolol Tartrate:  50  mg  Oral  2 Times a Day         PRN Medications       --------------------------------    1. Bisacodyl Rectal:  10  mg  Rectal  Daily    2. Dextrose 50% in Water Injectable:  25  gram(s)  IntraVenous Push  Every 15 Minutes    3. Glucagon Injectable:  1  mg  IntraMuscular  Every 15 Minutes    4. Morphine Injectable:  2  mg  IntraVenous Push  Every 2 Hours    5. Ondansetron Injectable:  4  mg  IntraVenous Push  Every 6 Hours    6. oxyCODONE Immediate Release:  5  mg  Oral  Every 4 Hours    7. oxyCODONE Immediate Release:  10  mg  Oral  Every 4 Hours        Recent Lab Results     Results:        I have reviewed these laboratory results:    Glucose_POCT  02-May-2023 08:10:00      Result Value    Glucose-POCT  212   H     Basic Metabolic Panel  02-May-2023 05:24:00      Result Value    Glucose, Serum  196   H   NA  137    K  3.4   L   CL  102    Bicarbonate, Serum  23    Anion Gap, Serum  15    BUN  31   H   CREAT  1.20    GFR Male  62    Calcium, Serum  8.5   L     Complete Blood Count  02-May-2023 05:24:00      Result Value    White Blood Cell Count  10.0    Red Blood Cell  Count  3.99   L   HGB  12.6   L   HCT  38.6   L   MCV  97    MCHC  32.6    PLT  207    RDW-CV  13.2        Radiology Results     Results:        Impression:    Satisfactory AP alignment of recent right shoulder arthroplasty.     Xray Shoulder 1 View [May  1 2023  1:40PM]      Assessment and Plan:   Code Status   ·  Code Status Full Code     Assessment:    EUGENE SERVIN is a 77 year old Male who is Hospital Day # 2 and POD #1 for 1. Right Shoulder Anatomic Total Replacement, doing well.      Neuro: Acute post-op pain as expected, managed with current pain regimen  Hx: Anxiety  - Continue scheduled Tylenol and Toradol  - Continue PRN Morphine and Oxycodone  - Continue Duloxetine  - OOB/ ambulate 5x per day      CV: RRR  Hx: HTN, HLD  - Continue Amlodipine and Metoprolol  - VS every 4 hours     Pulm: Patent airways, CTAB, normal breath sounds with good chest expansion, thorax symmetric.  - IS every hour while awake   - Pulse ox every 4 hours with VS     GI: Nondistended, soft, non-tender, no rebound tenderness or guarding, +BS. States he is passing gas, but denies BM. Tolerating a Diabetic diet without N/V.  - PRN antiemetic   - Bowel regimen: Scheduled Colace, PRN Bisacodyl    : Voiding independently without difficulty.  - Cr: 1.20  - Monitor I&Os  - Monitor and replete electrolytes as needed (K replaced today 5/2)     MUSK: ROM intact in left arm. ROM limited in right arm 2/2 recent shoulder replacement. Right arm/shoulder with sling in place. Neurovascularly intact without numbness/tingling. Strong right radial pulse. Able to move right fingers without difficulty.  Surgical dressing on right shoulder without any shadowing.  POD #1: Right shoulder anatomic total replacement.  Hx: OA, end stage glenohumeral artheritis  - NWB RUE  - Neurovascular assessment every shift  - Maintain right shoulder dressing. Reinforce as needed  - Maintain sling at all times, except when doing ROM exercises, bathing, and getting  dressed  - Ice right shoulder every hour for 20 mins and PRN  - Pain control (as above)  - PT/OT to see today (5/2)    HEME: DVT Proph   - Antiembolism stockings/ SCDs/ ambulate/ Aspirin  - H/H: 12.6/38.6    Endo:   Hx: DM2  -  this AM  - Continue Mild SSI    ID:  - Afebrile  - Monitor for s/s infection.  - WBC: 10.0    Dispo: Patient to work with PT/OT today. OOB and ambulating as much as possible. Possible discharge later this afternoon.    Total time spent 30 minutes, and greater than 50% of time was spent in counseling/coordination of care.    CODEY Chapman-CNP.    Electronic Signatures for Addendum Section:   Nick Escalera) (Signed Addendum 02-May-2023 14:28)   I agree with the above.     Electronic Signatures:  Jenna Bernabe (APRN-CNP)  (Signed 02-May-2023 11:33)   Authored: Service, Subjective Data, Objective Data, Assessment  and Plan, Note Completion      Last Updated: 02-May-2023 14:28 by Nick Escalera)

## 2023-09-25 ENCOUNTER — LAB (OUTPATIENT)
Dept: LAB | Facility: LAB | Age: 78
End: 2023-09-25
Payer: MEDICARE

## 2023-09-25 DIAGNOSIS — E11.9 DIABETES MELLITUS WITHOUT COMPLICATION (MULTI): ICD-10-CM

## 2023-09-25 LAB
ALANINE AMINOTRANSFERASE (SGPT) (U/L) IN SER/PLAS: 15 U/L (ref 10–52)
ALBUMIN (G/DL) IN SER/PLAS: 4.4 G/DL (ref 3.4–5)
ALKALINE PHOSPHATASE (U/L) IN SER/PLAS: 109 U/L (ref 33–136)
ANION GAP IN SER/PLAS: 14 MMOL/L (ref 10–20)
ASPARTATE AMINOTRANSFERASE (SGOT) (U/L) IN SER/PLAS: 18 U/L (ref 9–39)
BILIRUBIN TOTAL (MG/DL) IN SER/PLAS: 0.5 MG/DL (ref 0–1.2)
CALCIUM (MG/DL) IN SER/PLAS: 10 MG/DL (ref 8.6–10.6)
CARBON DIOXIDE, TOTAL (MMOL/L) IN SER/PLAS: 30 MMOL/L (ref 21–32)
CHLORIDE (MMOL/L) IN SER/PLAS: 103 MMOL/L (ref 98–107)
CHOLESTEROL (MG/DL) IN SER/PLAS: 197 MG/DL (ref 0–199)
CHOLESTEROL IN HDL (MG/DL) IN SER/PLAS: 45.3 MG/DL
CHOLESTEROL/HDL RATIO: 4.3
CREATININE (MG/DL) IN SER/PLAS: 1.14 MG/DL (ref 0.5–1.3)
ESTIMATED AVERAGE GLUCOSE FOR HBA1C: 151 MG/DL
GFR MALE: 66 ML/MIN/1.73M2
GLUCOSE (MG/DL) IN SER/PLAS: 136 MG/DL (ref 74–99)
HEMOGLOBIN A1C/HEMOGLOBIN TOTAL IN BLOOD: 6.9 %
LDL: 104 MG/DL (ref 0–99)
NON HDL CHOLESTEROL: 152 MG/DL
POTASSIUM (MMOL/L) IN SER/PLAS: 4.5 MMOL/L (ref 3.5–5.3)
PROTEIN TOTAL: 7.6 G/DL (ref 6.4–8.2)
SODIUM (MMOL/L) IN SER/PLAS: 142 MMOL/L (ref 136–145)
TRIGLYCERIDE (MG/DL) IN SER/PLAS: 238 MG/DL (ref 0–149)
UREA NITROGEN (MG/DL) IN SER/PLAS: 20 MG/DL (ref 6–23)
VLDL: 48 MG/DL (ref 0–40)

## 2023-09-25 PROCEDURE — 36415 COLL VENOUS BLD VENIPUNCTURE: CPT

## 2023-09-25 PROCEDURE — 80053 COMPREHEN METABOLIC PANEL: CPT

## 2023-09-25 PROCEDURE — 80061 LIPID PANEL: CPT

## 2023-09-25 PROCEDURE — 83036 HEMOGLOBIN GLYCOSYLATED A1C: CPT

## 2023-09-28 ENCOUNTER — OFFICE VISIT (OUTPATIENT)
Dept: PRIMARY CARE | Facility: CLINIC | Age: 78
End: 2023-09-28
Payer: MEDICARE

## 2023-09-28 VITALS
WEIGHT: 194 LBS | DIASTOLIC BLOOD PRESSURE: 82 MMHG | BODY MASS INDEX: 34.37 KG/M2 | SYSTOLIC BLOOD PRESSURE: 136 MMHG | HEART RATE: 62 BPM | TEMPERATURE: 95.6 F | OXYGEN SATURATION: 91 %

## 2023-09-28 DIAGNOSIS — Z12.5 SCREENING FOR PROSTATE CANCER: Primary | ICD-10-CM

## 2023-09-28 DIAGNOSIS — I10 PRIMARY HYPERTENSION: ICD-10-CM

## 2023-09-28 DIAGNOSIS — Z79.4 CONTROLLED TYPE 2 DIABETES MELLITUS WITH HYPERGLYCEMIA, WITH LONG-TERM CURRENT USE OF INSULIN (MULTI): ICD-10-CM

## 2023-09-28 DIAGNOSIS — E78.2 MIXED HYPERLIPIDEMIA: ICD-10-CM

## 2023-09-28 DIAGNOSIS — E11.65 CONTROLLED TYPE 2 DIABETES MELLITUS WITH HYPERGLYCEMIA, WITH LONG-TERM CURRENT USE OF INSULIN (MULTI): ICD-10-CM

## 2023-09-28 DIAGNOSIS — F33.42 RECURRENT MAJOR DEPRESSIVE DISORDER, IN FULL REMISSION (CMS-HCC): ICD-10-CM

## 2023-09-28 PROCEDURE — 1159F MED LIST DOCD IN RCRD: CPT | Performed by: FAMILY MEDICINE

## 2023-09-28 PROCEDURE — 3075F SYST BP GE 130 - 139MM HG: CPT | Performed by: FAMILY MEDICINE

## 2023-09-28 PROCEDURE — 1036F TOBACCO NON-USER: CPT | Performed by: FAMILY MEDICINE

## 2023-09-28 PROCEDURE — 1160F RVW MEDS BY RX/DR IN RCRD: CPT | Performed by: FAMILY MEDICINE

## 2023-09-28 PROCEDURE — 99214 OFFICE O/P EST MOD 30 MIN: CPT | Performed by: FAMILY MEDICINE

## 2023-09-28 PROCEDURE — 3079F DIAST BP 80-89 MM HG: CPT | Performed by: FAMILY MEDICINE

## 2023-09-28 ASSESSMENT — ENCOUNTER SYMPTOMS
DEPRESSION: 1
HYPERTENSION: 1

## 2023-09-28 NOTE — PROGRESS NOTES
Subjective   Patient ID: Saturnino Escobedo is a 78 y.o. male who presents for Anxiety, Depression, Hypertension, and Hyperlipidemia (Recheck/Review bw).  Anxiety        Depression  Hypertension  Associated symptoms include anxiety.   Hyperlipidemia      1. lipids: well controlled    2. anxiety: doing well overall.      3. HTN: weight is good, stable    4. DM2: Trying to watch his diet.  A1c was 6.9 in April.    5. OA: stable    Patient Active Problem List   Diagnosis    Type 2 diabetes mellitus (CMS/[facility])    S/P knee replacement    Right shoulder pain    Pain in joint, lower leg    Osteoarthritis of right glenohumeral joint    Nuclear senile cataract    Nocturnal enuresis    Lumbosacral spondylosis without myelopathy    Degeneration of lumbar or lumbosacral intervertebral disc    Impaired fasting glucose    Hx of LASIK    Hyperlipidemia    HTN (hypertension)    Generalized osteoarthritis    Functional disorder of stomach    DJD (degenerative joint disease)    Osteoarthritis of knee    Depression    Carpal tunnel syndrome    Benign prostatic hyperplasia    Benign essential hypertension    Anxiety state    Acquired spondylolisthesis    Umbilical hernia       Social Connections: Not on file       Current Outpatient Medications on File Prior to Visit   Medication Sig Dispense Refill    amLODIPine (Norvasc) 10 mg tablet Take 1 tablet (10 mg) by mouth once daily. 90 tablet 3    amoxicillin (Amoxil) 500 mg capsule Take 4 capsules (2,000 mg) by mouth. 1 HOUR PRIOR TO DENTAL APPT.      aspirin 81 mg capsule Take by mouth.      aspirin 81 mg EC tablet Take 1 tablet (81 mg) by mouth once daily.      baclofen (Lioresal) 10 mg tablet Take 0.5-1 tablets (5-10 mg) by mouth 3 times a day as needed.      buPROPion XL (Wellbutrin XL) 150 mg 24 hr tablet Take 1 tablet (150 mg) by mouth once daily in the morning. Do not crush, chew, or split. 90 tablet 1    chlorhexidine (Peridex) 0.12 % solution Use 15 mL in the mouth or throat.  ONCE A  "DAY NIGHT PRIOR TO SURGERY AND MORNING OF SURGERY      dorzolamide-timoloL (Cosopt) 22.3-6.8 mg/mL ophthalmic solution Administer into affected eye(s). Dorzolamide HCl-Timolol Mal 22.3-6.8 MG/ML Ophthalmic Solution Quantity: 10 Refills: 0 Start : 21-Apr-2022 Active      DULoxetine (Cymbalta) 60 mg DR capsule Take 1 capsule (60 mg) by mouth once daily. 90 capsule 3    empagliflozin (Jardiance) 25 mg Take 1 tablet (25 mg) by mouth once daily. 90 tablet 3    famotidine (Pepcid) 20 mg tablet Take by mouth.      fish oil concentrate (Omega-3) 120-180 mg capsule       flash glucose sensor (FREESTYLE NASREEN 2 SENSOR [facility] [place]) by Does not apply route. disp 1 device to be used [id number]      FreeStyle Nasreen reader (FreeStyle Nasreen 2 [place]) misc disp 1 device      insulin NPH, Isophane, (NovoLIN N FlexPen) 100 unit/mL (3 mL) injection Inject 20 Units under the skin 2 times a day before meals. Take as directed per insulin instructions.  Please dispense Relion version. 36 mL 1    insulin syringe-needle U-100 31G X 5/16\" 0.5 mL syringe Use to inject 1-4 times daily as directed. 200 each 3    latanoprost (Xalatan) 0.005 % ophthalmic solution Administer into affected eye(s).      losartan-hydrochlorothiazide (Hyzaar) 100-25 mg tablet Take 1 tablet by mouth once daily. 90 tablet 3    lovastatin (Mevacor) 40 mg tablet Take 1 tablet (40 mg) by mouth once daily at bedtime. 90 tablet 3    metFORMIN (Glucophage) 500 mg tablet Take 1 tablet (500 mg) by mouth 2 times a day with meals. 180 tablet 3    metoprolol succinate XL (Toprol-XL) 50 mg 24 hr tablet Take 1 tablet (50 mg) by mouth once daily.      metoprolol tartrate (Lopressor) 50 mg tablet Take 1 tablet (50 mg) by mouth 2 times a day. 180 tablet 3    MULTIVITAMIN ORAL Take by mouth once daily.      naproxen (Naprosyn) 250 mg tablet Take 1 tablet (250 mg) by mouth 2 times a day with meals. For pain      naproxen (Naprosyn) 500 mg tablet Take 1 tablet (500 mg) by mouth 2 times a day.      " "oxyCODONE (Roxicodone) 5 mg immediate release tablet       pen needle, diabetic 31 gauge x 1/4\" needle To use bid daily 200 each 1    pen needle, diabetic 31 gauge x 15/64\" needle USE AS DIRECTED DAILY      potassium chloride ER (Micro-K) 10 mEq ER capsule Take 1 capsule (10 mEq) by mouth once daily. 90 capsule 3    timoloL maleate 0.5 % drops, once daily Administer into affected eye(s).       No current facility-administered medications on file prior to visit.        Vitals:    09/28/23 1027   BP: 136/82   Pulse: 62   Temp: 35.3 °C (95.6 °F)   SpO2: 91%     Vitals:    09/28/23 1027   Weight: 88 kg (194 lb)       Review of Systems   Psychiatric/Behavioral:  Positive for depression.    All other systems reviewed and are negative.      Objective     Physical Exam  Constitutional:       Appearance: Normal appearance. He is well-developed.   HENT:      Head: Atraumatic.   Cardiovascular:      Rate and Rhythm: Normal rate and regular rhythm.      Heart sounds: Normal heart sounds. No murmur heard.  Pulmonary:      Effort: Pulmonary effort is normal.      Breath sounds: Normal breath sounds.   Abdominal:      General: Bowel sounds are normal.      Palpations: Abdomen is soft.   Skin:     General: Skin is warm.   Neurological:      General: No focal deficit present.      Mental Status: He is alert.   Psychiatric:         Mood and Affect: Mood normal.         Lab on 09/25/2023   Component Date Value Ref Range Status    Hemoglobin A1C 09/25/2023 6.9 (A)  % Final         Diagnosis of Diabetes-Adults   Non-Diabetic: < or = 5.6%   Increased risk for developing diabetes: 5.7-6.4%   Diagnostic of diabetes: > or = 6.5%  .       Monitoring of Diabetes                Age (y)     Therapeutic Goal (%)   Adults:          >18           <7.0   Pediatrics:    13-18           <7.5                   7-12           <8.0                   0- 6            7.5-8.5   American Diabetes Association. Diabetes Care 33(S1), Jan 2010.    Estimated " Average Glucose 09/25/2023 151  MG/DL Final    Glucose 09/25/2023 136 (H)  74 - 99 mg/dL Final    Sodium 09/25/2023 142  136 - 145 mmol/L Final    Potassium 09/25/2023 4.5  3.5 - 5.3 mmol/L Final    Chloride 09/25/2023 103  98 - 107 mmol/L Final    Bicarbonate 09/25/2023 30  21 - 32 mmol/L Final    Anion Gap 09/25/2023 14  10 - 20 mmol/L Final    Urea Nitrogen 09/25/2023 20  6 - 23 mg/dL Final    Creatinine 09/25/2023 1.14  0.50 - 1.30 mg/dL Final    GFR MALE 09/25/2023 66  >90 mL/min/1.73m2 Final     CALCULATIONS OF ESTIMATED GFR ARE PERFORMED   USING THE 2021 CKD-EPI STUDY REFIT EQUATION   WITHOUT THE RACE VARIABLE FOR THE IDMS-TRACEABLE   CREATININE METHODS.    https://jasn.asnjournals.org/content/early/2021/09/22/ASN.6673236722    Calcium 09/25/2023 10.0  8.6 - 10.6 mg/dL Final    Albumin 09/25/2023 4.4  3.4 - 5.0 g/dL Final    Alkaline Phosphatase 09/25/2023 109  33 - 136 U/L Final    Total Protein 09/25/2023 7.6  6.4 - 8.2 g/dL Final    AST 09/25/2023 18  9 - 39 U/L Final    Total Bilirubin 09/25/2023 0.5  0.0 - 1.2 mg/dL Final    ALT (SGPT) 09/25/2023 15  10 - 52 U/L Final     Patients treated with Sulfasalazine may generate    falsely decreased results for ALT.    Cholesterol 09/25/2023 197  0 - 199 mg/dL Final    .      AGE      DESIRABLE   BORDERLINE HIGH   HIGH     0-19 Y     0 - 169       170 - 199     >/= 200    20-24 Y     0 - 189       190 - 224     >/= 225         >24 Y     0 - 199       200 - 239     >/= 240   **All ranges are based on fasting samples. Specific   therapeutic targets will vary based on patient-specific   cardiac risk.  .   Pediatric guidelines reference:Pediatrics 2011, 128(S5).   Adult guidelines reference: NCEP ATPIII Guidelines,     FANNY 2001, 258:2486-97  .   Venipuncture immediately after or during the    administration of Metamizole may lead to falsely   low results. Testing should be performed immediately   prior to Metamizole dosing.    HDL 09/25/2023 45.3  mg/dL Final    .       AGE      VERY LOW   LOW     NORMAL    HIGH       0-19 Y       < 35   < 40     40-45     ----    20-24 Y       ----   < 40       >45     ----      >24 Y       ----   < 40     40-60      >60  .    Cholesterol/HDL Ratio 09/25/2023 4.3   Final    REF VALUES  DESIRABLE  < 3.4  HIGH RISK  > 5.0    LDL 09/25/2023 104 (H)  0 - 99 mg/dL Final    .                           NEAR      BORD      AGE      DESIRABLE  OPTIMAL    HIGH     HIGH     VERY HIGH     0-19 Y     0 - 109     ---    110-129   >/= 130     ----    20-24 Y     0 - 119     ---    120-159   >/= 160     ----      >24 Y     0 -  99   100-129  130-159   160-189     >/=190  .    VLDL 09/25/2023 48 (H)  0 - 40 mg/dL Final    Triglycerides 09/25/2023 238 (H)  0 - 149 mg/dL Final    .      AGE      DESIRABLE   BORDERLINE HIGH   HIGH     VERY HIGH   0 D-90 D    19 - 174         ----         ----        ----  91 D- 9 Y     0 -  74        75 -  99     >/= 100      ----    10-19 Y     0 -  89        90 - 129     >/= 130      ----    20-24 Y     0 - 114       115 - 149     >/= 150      ----         >24 Y     0 - 149       150 - 199    200- 499    >/= 500  .   Venipuncture immediately after or during the    administration of Metamizole may lead to falsely   low results. Testing should be performed immediately   prior to Metamizole dosing.    Non HDL Cholesterol 09/25/2023 152  mg/dL Final        AGE      DESIRABLE   BORDERLINE HIGH   HIGH     VERY HIGH     0-19 Y     0 - 119       120 - 144     >/= 145    >/= 160    20-24 Y     0 - 149       150 - 189     >/= 190      ----         >24 Y    30 MG/DL ABOVE LDL CHOLESTEROL GOAL  .       Assessment/Plan   Problem List Items Addressed This Visit             ICD-10-CM    Hyperlipidemia E78.5    HTN (hypertension) I10    Relevant Orders    Hemoglobin A1C    Comprehensive Metabolic Panel    Lipid Panel    Depression F32.A     Other Visit Diagnoses         Codes    Screening for prostate cancer    -  Primary Z12.5    Relevant  Orders    Prostate Specific Antigen    Controlled type 2 diabetes mellitus with hyperglycemia, with long-term current use of insulin (CMS/[facility])     E11.65, Z79.4    Relevant Orders    Hemoglobin A1C          Counseled patient to exercise every single day before he uses computer.  Aim for 30-minute walk per day.  Try to lose weight.  Follow-up in 3 months

## 2023-10-02 NOTE — OP NOTE
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 Right Shoulder Anatomic Total Replacement    Anesthesia: Benton Snowden  Estimated Blood Loss: 75  Findings: see op note  Complications: none        Operative Report:   Indications for procedure: The patient was diagnosed with end stage glenohumeral arthritis of the right shoulder. Clinical exam and imaging were consistent  with an intact and well-functioning rotator cuff. Patient was having severe limitations in daily function as well as consistent pain after attempting nonoperative options. Treatment options were discussed. The patient was proposed to have total shoulder  arthroplasty and other procedures as indicated. Risks and benefits of surgery and alternatives of treatment were discussed.  The patient understood all the risks and benefits and wanted to proceed with surgical option.    Description of procedure: Patient was met in the preoperative holding area. Consent for surgery was reviewed and the correct operative extremity was verified and marked. The patient then underwent a preoperative nerve block, please see anesthesia records  regarding this. The patient was then brought to the operating room and was placed in a supine position on the operating table. General anesthesia was induced. The patient was placed in a beach chair position to around 30 degrees of inclination, and all  the bony prominences were well padded. The operative upper extremity was prepped and draped in usual sterile fashion.  A time out was held confirming the correct patient, operative side, operative procedure, preoperative antibiotics, preoperative TXA,  implants being present and that it was safe to proceed--all were in agreement it was safe to  proceed.    Attention was directed to the operative shoulder.  A deltopectoral incision was made from just above to the coracoid to the deltoid insertion.  Skin was incised with a knife and the deep dermal layer was incised using electrocautery.  Dissection was carried  out through the subcutaneous tissue to the level of the deltoid fascia.  The skin flaps medially and laterally were then made and the cephalic vein was identified.  The cephalic vein was carefully mobilized laterally and any crossing vessels were cauterized.   The interval between the deltoid and pectoralis major was developed in the pectoralis major insertion was palpated.  About a centimeter of the upper portion of the pectoralis major was released.  The subdeltoid space and subacromial space was then carefully  freed of adhesions using blunt dissection.  Clavipectoral fascia was then incised and the conjoined tendon was mobilized medially.  The axillary nerve was palpated underneath the conjoined tendon.  The anterior humeral circumflex vessels were identified  and cauterized electrocautery.  Biceps tendon was then identified and noted to be partially torn and inflamed and tenodesed to the upper portion of the pectoralis major.  The biceps tunnel was then unroofed to the base of the coracoid and the biceps tendon  was excised leaving a small stump still attached to the superior labrum.     The lower border of the subscapularis was then defined and marked with electrocautery.  The lesser tuberosity osteotomy was then performed using a curved and straight osteotome.  The osteotomy fragment was tagged with a suture to help with mobilization.   Using electrocautery and sequential external rotation of the shoulder, the capsule was removed superiorly to inferiorly and then inferiorly around the humeral neck.  Care was taken to protect the axillary nerve throughout the capsular release.  Osteophyte  was visualized.  Capsule was released past the 6  o'clock position in the posterior inferior glenohumeral ligament was also released.  The shoulder was then able to be dislocated at this time.  At this point, the severity of disease was clear.  There was  end-stage loss of cartilage and exposure of subchondral bone as well as significant osteophyte formation on the humeral head.  Osteophytes were  removed using a rongeur to define the native humeral neck. The rotator cuff was intact. Protecting the rotator  cuff, the humeral head was cut in native version, flush with the rotator cuff superiorly and without violating the bare area posterosuperiorly. A humeral head protector was placed.    Attention was then directed to the glenoid. A fukuda retractor was placed posteriorly behind the glenoid.  A blunt Hohmann was placed to protect the axillary nerve inferiorly.  The interval between subscapularis and capsule was dissected and the capsule  was cut from inferior to superior using dissecting scissors.  The capsular flap was then excised, removing the majority of the inferior and anterior diseased capsule.  Retractors were then placed anteriorly and superiorly along the glenoid.  The remaining  biceps stump and remaining anterior, inferior and posterior labrum were then removed using electrocautery.   The arthritic bony anatomy of the glenoid was then well visualized.  A guidepin was then placed at the midpoint of the glenoid surface. Reaming  was performed to achieve full backside support for the implant. A peg drill guide was used to drill holes for the pegs and the central peg. The glenoid surface was irrigated and dried. Placing cement into the central and superior peg holes, the final  glenoid implant was placed.    Final glenoid implant was 46    Attention was then redirected to the humerus.  The proximal humerus was prepped for a stemless implant using a guide wire, reamers and a fin punch. A trial humeral head based on the size of the humeral head was chosen.  The shoulder was reduced. Different  humeral head sizes were trialed until the final one was selected, which was the one that had good stability and 50% posterior translation with quick bounce back. Trial implants were then removed and the final stemless nucleus and humeral head were impacted.    Final humeral implants were size 2 nucleus, 50x18 humeral head    The lesser tuberosity osteotomy was then repaired.  Prior to the final humeral head insertion, a fibertape suture was placed around the wolf taper. 3 drill holes were placed superior to inferior in the bicipital groove and 3 fibertape sutures were passed  at the bone tendon junction of the lesser tuberosity.  The sutures were then passed sequentially through the drill holes, exiting in the lateral cortex. The two tails of the suture around the humeral head were also passed through the superior and inferior  drill holes. Sutures were then tied to secure the lesser tuberosity. Excellent and stable fixation of the lesser tuberosity osteotomy was achieved.    Copious irrigation was performed. Dilute betadine was used to wash the wound as well. 1 gram of vancomycin powder was placed in the wound. #1 Vicryl was used to close the deep tissue layer superficial to the deltopectoral interval, 2-0 Vicryl was used  to for subcutaneous closure, and 3-0 monocryl was used to close the skin. Dermabond was applied and a sterile, water proof dressing was placed.  The patient was placed in a sling with an abduction pillow.  The patient was brought out from general anesthesia  without any complications and was transported to postanesthesia care unit in good condition.    ATTESTATION: Dr. Escalera was present for the entirety of the procedure and personally performed all aspects of this procedure.    Postoperative plan: Patient will be clear for discharge when they fully awaken from anesthesia, demonstrates mobility, receives postoperative antibiotics  and tolerates a diet. The  patient will be seen by therapy prior to discharge to show appropriate elbow/wrist/hand exercises and how to take the sling on and off. Patient will be NWB operative extremity for 2 weeks, ASA 81 mg for 2 weeks for DVT prophylaxis.  A small course of narcotic pain medication will be provided for immediate postoperative pain, as well a tylenol.                         Attestation:   Note Completion:  Attending Attestation I was present for the entire procedure         Electronic Signatures:  Nick Escalera)  (Signed 01-May-2023 13:05)   Authored: Post-Operative Note, Chart Review, Note Completion      Last Updated: 01-May-2023 13:05 by Nick Escalera)

## 2023-10-24 ENCOUNTER — TELEPHONE (OUTPATIENT)
Dept: PRIMARY CARE | Facility: CLINIC | Age: 78
End: 2023-10-24
Payer: MEDICARE

## 2023-10-24 DIAGNOSIS — Z79.4 CONTROLLED TYPE 2 DIABETES MELLITUS WITH HYPERGLYCEMIA, WITH LONG-TERM CURRENT USE OF INSULIN (MULTI): ICD-10-CM

## 2023-10-24 DIAGNOSIS — E11.65 CONTROLLED TYPE 2 DIABETES MELLITUS WITH HYPERGLYCEMIA, WITH LONG-TERM CURRENT USE OF INSULIN (MULTI): ICD-10-CM

## 2023-10-24 NOTE — TELEPHONE ENCOUNTER
Patient is out of medicine went to get his refill and states we rejected the medicine can we please send this in today and call him when it is done.   insulin NPH, Isophane, (NovoLIN N FlexPen) 100 unit/mL (3 mL) injection     [facility] [place]

## 2023-10-25 RX ORDER — HUMAN INSULIN 100 [IU]/ML
20 INJECTION, SUSPENSION SUBCUTANEOUS
Qty: 36 ML | Refills: 1 | Status: SHIPPED | OUTPATIENT
Start: 2023-10-25 | End: 2024-01-15 | Stop reason: SDUPTHER

## 2023-11-03 DIAGNOSIS — F33.42 RECURRENT MAJOR DEPRESSIVE DISORDER, IN FULL REMISSION (CMS-HCC): ICD-10-CM

## 2023-11-03 NOTE — TELEPHONE ENCOUNTER
Pt called rx line at 316 requesting refill on Duloxetine.  1 year supply was sent in May.  Pt should have refills on hold need to inform pt. Thanks, CG

## 2023-11-08 RX ORDER — DULOXETIN HYDROCHLORIDE 60 MG/1
60 CAPSULE, DELAYED RELEASE ORAL DAILY
Qty: 90 CAPSULE | Refills: 3 | Status: SHIPPED | OUTPATIENT
Start: 2023-11-08 | End: 2024-11-07

## 2023-11-08 NOTE — TELEPHONE ENCOUNTER
Pt states this rx need sent to [facility] not to [facility] in [place]. Pt would like a call back when rx has been fixed pls advise ks      Can you please resend to [facility]

## 2023-12-18 DIAGNOSIS — E78.2 MIXED HYPERLIPIDEMIA: ICD-10-CM

## 2023-12-18 RX ORDER — LOVASTATIN 40 MG/1
40 TABLET ORAL NIGHTLY
Qty: 90 TABLET | Refills: 3 | Status: SHIPPED | OUTPATIENT
Start: 2023-12-18 | End: 2024-01-15 | Stop reason: ALTCHOICE

## 2023-12-18 NOTE — TELEPHONE ENCOUNTER
1 year supply of pended med was called in in May but pt states this medication needs to go through his mail order company.   OK to resend pended med to [facility]?   Pt next OV 12/21.   Pt is compliant.  Thanks, CG

## 2023-12-20 ENCOUNTER — LAB (OUTPATIENT)
Dept: LAB | Facility: LAB | Age: 78
End: 2023-12-20
Payer: MEDICARE

## 2023-12-20 DIAGNOSIS — Z79.4 CONTROLLED TYPE 2 DIABETES MELLITUS WITH HYPERGLYCEMIA, WITH LONG-TERM CURRENT USE OF INSULIN (MULTI): ICD-10-CM

## 2023-12-20 DIAGNOSIS — Z12.5 SCREENING FOR PROSTATE CANCER: ICD-10-CM

## 2023-12-20 DIAGNOSIS — E11.65 CONTROLLED TYPE 2 DIABETES MELLITUS WITH HYPERGLYCEMIA, WITH LONG-TERM CURRENT USE OF INSULIN (MULTI): ICD-10-CM

## 2023-12-20 DIAGNOSIS — I10 PRIMARY HYPERTENSION: ICD-10-CM

## 2023-12-20 LAB
ALBUMIN SERPL BCP-MCNC: 4.3 G/DL (ref 3.4–5)
ALP SERPL-CCNC: 98 U/L (ref 33–136)
ALT SERPL W P-5'-P-CCNC: 18 U/L (ref 10–52)
ANION GAP SERPL CALC-SCNC: 16 MMOL/L (ref 10–20)
AST SERPL W P-5'-P-CCNC: 19 U/L (ref 9–39)
BILIRUB SERPL-MCNC: 0.6 MG/DL (ref 0–1.2)
BUN SERPL-MCNC: 20 MG/DL (ref 6–23)
CALCIUM SERPL-MCNC: 9.8 MG/DL (ref 8.6–10.6)
CHLORIDE SERPL-SCNC: 102 MMOL/L (ref 98–107)
CHOLEST SERPL-MCNC: 214 MG/DL (ref 0–199)
CHOLESTEROL/HDL RATIO: 4.8
CO2 SERPL-SCNC: 28 MMOL/L (ref 21–32)
CREAT SERPL-MCNC: 1.04 MG/DL (ref 0.5–1.3)
EST. AVERAGE GLUCOSE BLD GHB EST-MCNC: 148 MG/DL
GFR SERPL CREATININE-BSD FRML MDRD: 74 ML/MIN/1.73M*2
GLUCOSE SERPL-MCNC: 94 MG/DL (ref 74–99)
HBA1C MFR BLD: 6.8 %
HDLC SERPL-MCNC: 45 MG/DL
LDLC SERPL CALC-MCNC: 120 MG/DL
NON HDL CHOLESTEROL: 169 MG/DL (ref 0–149)
POTASSIUM SERPL-SCNC: 4 MMOL/L (ref 3.5–5.3)
PROT SERPL-MCNC: 7.5 G/DL (ref 6.4–8.2)
PSA SERPL-MCNC: 3.54 NG/ML
SODIUM SERPL-SCNC: 142 MMOL/L (ref 136–145)
TRIGL SERPL-MCNC: 244 MG/DL (ref 0–149)
VLDL: 49 MG/DL (ref 0–40)

## 2023-12-20 PROCEDURE — 36415 COLL VENOUS BLD VENIPUNCTURE: CPT

## 2023-12-20 PROCEDURE — G0103 PSA SCREENING: HCPCS

## 2023-12-20 PROCEDURE — 80061 LIPID PANEL: CPT

## 2023-12-20 PROCEDURE — 80053 COMPREHEN METABOLIC PANEL: CPT

## 2023-12-20 PROCEDURE — 83036 HEMOGLOBIN GLYCOSYLATED A1C: CPT

## 2024-01-15 ENCOUNTER — OFFICE VISIT (OUTPATIENT)
Dept: PRIMARY CARE | Facility: CLINIC | Age: 79
End: 2024-01-15
Payer: MEDICARE

## 2024-01-15 VITALS
OXYGEN SATURATION: 97 % | DIASTOLIC BLOOD PRESSURE: 80 MMHG | BODY MASS INDEX: 35.25 KG/M2 | TEMPERATURE: 96.5 F | SYSTOLIC BLOOD PRESSURE: 140 MMHG | WEIGHT: 199 LBS | HEART RATE: 72 BPM

## 2024-01-15 DIAGNOSIS — E11.65 CONTROLLED TYPE 2 DIABETES MELLITUS WITH HYPERGLYCEMIA, WITH LONG-TERM CURRENT USE OF INSULIN (MULTI): ICD-10-CM

## 2024-01-15 DIAGNOSIS — I10 PRIMARY HYPERTENSION: Primary | ICD-10-CM

## 2024-01-15 DIAGNOSIS — Z79.4 CONTROLLED TYPE 2 DIABETES MELLITUS WITH HYPERGLYCEMIA, WITH LONG-TERM CURRENT USE OF INSULIN (MULTI): ICD-10-CM

## 2024-01-15 DIAGNOSIS — E78.2 MIXED HYPERLIPIDEMIA: ICD-10-CM

## 2024-01-15 DIAGNOSIS — F32.5 MAJOR DEPRESSIVE DISORDER IN FULL REMISSION, UNSPECIFIED WHETHER RECURRENT (CMS-HCC): ICD-10-CM

## 2024-01-15 PROCEDURE — 3079F DIAST BP 80-89 MM HG: CPT | Performed by: FAMILY MEDICINE

## 2024-01-15 PROCEDURE — 1036F TOBACCO NON-USER: CPT | Performed by: FAMILY MEDICINE

## 2024-01-15 PROCEDURE — 99214 OFFICE O/P EST MOD 30 MIN: CPT | Performed by: FAMILY MEDICINE

## 2024-01-15 PROCEDURE — 3077F SYST BP >= 140 MM HG: CPT | Performed by: FAMILY MEDICINE

## 2024-01-15 RX ORDER — HUMAN INSULIN 100 [IU]/ML
20 INJECTION, SUSPENSION SUBCUTANEOUS
Qty: 36 ML | Refills: 1 | Status: SHIPPED | OUTPATIENT
Start: 2024-01-15

## 2024-01-15 RX ORDER — HUMAN INSULIN 100 [IU]/ML
20 INJECTION, SUSPENSION SUBCUTANEOUS
Qty: 36 ML | Refills: 1 | Status: SHIPPED | OUTPATIENT
Start: 2024-01-15 | End: 2024-01-15 | Stop reason: SDUPTHER

## 2024-01-15 RX ORDER — ROSUVASTATIN CALCIUM 20 MG/1
20 TABLET, COATED ORAL DAILY
Qty: 90 TABLET | Refills: 1 | Status: SHIPPED | OUTPATIENT
Start: 2024-01-15 | End: 2024-07-13

## 2024-01-15 ASSESSMENT — ENCOUNTER SYMPTOMS
HYPERTENSION: 1
DEPRESSION: 1

## 2024-01-15 NOTE — PROGRESS NOTES
Subjective   Patient ID: Saturnino Escobedo is a 78 y.o. male who presents for Hyperlipidemia, Diabetes, and Hypertension (Recheck/Review bw ).  Anxiety        Depression  Hypertension  Associated symptoms include anxiety.   Hyperlipidemia      1. lipids: well controlled    2. anxiety: doing well overall.      3. HTN: weight is good, stable    4. DM2: Trying to watch his diet.  A1c was 6.8.    5. OA: stable    Patient Active Problem List   Diagnosis    Type 2 diabetes mellitus (CMS/[facility])    S/P knee replacement    Right shoulder pain    Pain in joint, lower leg    Osteoarthritis of right glenohumeral joint    Nuclear senile cataract    Nocturnal enuresis    Lumbosacral spondylosis without myelopathy    Degeneration of lumbar or lumbosacral intervertebral disc    Impaired fasting glucose    Hx of LASIK    Hyperlipidemia    HTN (hypertension)    Generalized osteoarthritis    Functional disorder of stomach    DJD (degenerative joint disease)    Osteoarthritis of knee    Depression    Carpal tunnel syndrome    Benign prostatic hyperplasia    Benign essential hypertension    Anxiety state    Acquired spondylolisthesis    Umbilical hernia       Social Connections: Not on file       Current Outpatient Medications on File Prior to Visit   Medication Sig Dispense Refill    amLODIPine (Norvasc) 10 mg tablet Take 1 tablet (10 mg) by mouth once daily. 90 tablet 3    amoxicillin (Amoxil) 500 mg capsule Take 4 capsules (2,000 mg) by mouth. 1 HOUR PRIOR TO DENTAL APPT.      aspirin 81 mg capsule Take by mouth.      aspirin 81 mg EC tablet Take 1 tablet (81 mg) by mouth once daily.      baclofen (Lioresal) 10 mg tablet Take 0.5-1 tablets (5-10 mg) by mouth 3 times a day as needed.      buPROPion XL (Wellbutrin XL) 150 mg 24 hr tablet Take 1 tablet (150 mg) by mouth once daily in the morning. Do not crush, chew, or split. 90 tablet 1    chlorhexidine (Peridex) 0.12 % solution Use 15 mL in the mouth or throat.  ONCE A DAY NIGHT PRIOR TO  "SURGERY AND MORNING OF SURGERY      dorzolamide-timoloL (Cosopt) 22.3-6.8 mg/mL ophthalmic solution Administer into affected eye(s). Dorzolamide HCl-Timolol Mal 22.3-6.8 MG/ML Ophthalmic Solution Quantity: 10 Refills: 0 Start : 21-Apr-2022 Active      DULoxetine (Cymbalta) 60 mg DR capsule Take 1 capsule (60 mg) by mouth once daily. 90 capsule 3    empagliflozin (Jardiance) 25 mg Take 1 tablet (25 mg) by mouth once daily. 90 tablet 3    famotidine (Pepcid) 20 mg tablet Take by mouth.      fish oil concentrate (Omega-3) 120-180 mg capsule       flash glucose sensor (FREESTYLE NASREEN 2 SENSOR [facility]) by Does not apply route. disp 1 device to be used [id number]      FreeStyle Nasreen reader (FreeStyle Nasreen 2 [place]) misc disp 1 device      insulin NPH, Isophane, (NovoLIN N FlexPen) 100 unit/mL (3 mL) injection Inject 20 Units under the skin 2 times a day before meals. Take as directed per insulin instructions.  Please dispense Relion version. 36 mL 1    insulin syringe-needle U-100 31G X 5/16\" 0.5 mL syringe Use to inject 1-4 times daily as directed. 200 each 3    latanoprost (Xalatan) 0.005 % ophthalmic solution Administer into affected eye(s).      losartan-hydrochlorothiazide (Hyzaar) 100-25 mg tablet Take 1 tablet by mouth once daily. 90 tablet 3    lovastatin (Mevacor) 40 mg tablet Take 1 tablet (40 mg) by mouth once daily at bedtime. 90 tablet 3    metFORMIN (Glucophage) 500 mg tablet Take 1 tablet (500 mg) by mouth 2 times a day with meals. 180 tablet 3    metoprolol succinate XL (Toprol-XL) 50 mg 24 hr tablet Take 1 tablet (50 mg) by mouth once daily.      metoprolol tartrate (Lopressor) 50 mg tablet Take 1 tablet (50 mg) by mouth 2 times a day. 180 tablet 3    MULTIVITAMIN ORAL Take by mouth once daily.      naproxen (Naprosyn) 250 mg tablet Take 1 tablet (250 mg) by mouth 2 times a day with meals. For pain      naproxen (Naprosyn) 500 mg tablet Take 1 tablet (500 mg) by mouth 2 times a day.      oxyCODONE " "(Roxicodone) 5 mg immediate release tablet       pen needle, diabetic 31 gauge x 1/4\" needle To use bid daily 200 each 1    pen needle, diabetic 31 gauge x 15/64\" needle USE AS DIRECTED DAILY      potassium chloride ER (Micro-K) 10 mEq ER capsule Take 1 capsule (10 mEq) by mouth once daily. 90 capsule 3    timoloL maleate 0.5 % drops, once daily Administer into affected eye(s).       No current facility-administered medications on file prior to visit.        Vitals:    01/15/24 1012   BP: 140/80   Pulse: 72   Temp: 35.8 °C (96.5 °F)   SpO2: 97%     Vitals:    01/15/24 1012   Weight: 90.3 kg (199 lb)       Review of Systems   All other systems reviewed and are negative.      Objective     Physical Exam  Constitutional:       Appearance: Normal appearance. He is well-developed.   HENT:      Head: Atraumatic.   Cardiovascular:      Rate and Rhythm: Normal rate and regular rhythm.      Heart sounds: Normal heart sounds. No murmur heard.  Pulmonary:      Effort: Pulmonary effort is normal.      Breath sounds: Normal breath sounds.   Abdominal:      General: Bowel sounds are normal.      Palpations: Abdomen is soft.   Skin:     General: Skin is warm.   Neurological:      General: No focal deficit present.      Mental Status: He is alert.   Psychiatric:         Mood and Affect: Mood normal.         Lab on 12/20/2023   Component Date Value Ref Range Status    Hemoglobin A1C 12/20/2023 6.8 (H)  see below % Final    Estimated Average Glucose 12/20/2023 148  Not Established mg/dL Final    Glucose 12/20/2023 94  74 - 99 mg/dL Final    Sodium 12/20/2023 142  136 - 145 mmol/L Final    Potassium 12/20/2023 4.0  3.5 - 5.3 mmol/L Final    Chloride 12/20/2023 102  98 - 107 mmol/L Final    Bicarbonate 12/20/2023 28  21 - 32 mmol/L Final    Anion Gap 12/20/2023 16  10 - 20 mmol/L Final    Urea Nitrogen 12/20/2023 20  6 - 23 mg/dL Final    Creatinine 12/20/2023 1.04  0.50 - 1.30 mg/dL Final    eGFR 12/20/2023 74  >60 mL/min/1.73m*2 " Final    Calculations of estimated GFR are performed using the 2021 CKD-EPI Study Refit equation without the race variable for the IDMS-Traceable creatinine methods.  https://jasn.asnjournals.org/content/early/2021/09/22/ASN.7544621217    Calcium 12/20/2023 9.8  8.6 - 10.6 mg/dL Final    Albumin 12/20/2023 4.3  3.4 - 5.0 g/dL Final    Alkaline Phosphatase 12/20/2023 98  33 - 136 U/L Final    Total Protein 12/20/2023 7.5  6.4 - 8.2 g/dL Final    AST 12/20/2023 19  9 - 39 U/L Final    Bilirubin, Total 12/20/2023 0.6  0.0 - 1.2 mg/dL Final    ALT 12/20/2023 18  10 - 52 U/L Final    Patients treated with Sulfasalazine may generate falsely decreased results for ALT.    Cholesterol 12/20/2023 214 (H)  0 - 199 mg/dL Final          Age      Desirable   Borderline High   High     0-19 Y     0 - 169       170 - 199     >/= 200    20-24 Y     0 - 189       190 - 224     >/= 225         >24 Y     0 - 199       200 - 239     >/= 240   **All ranges are based on fasting samples. Specific   therapeutic targets will vary based on patient-specific   cardiac risk.    Pediatric guidelines reference:Pediatrics 2011, 128(S5).Adult guidelines reference: NCEP ATPIII Guidelines,FANNY 2001, 258:2486-97    Venipuncture immediately after or during the administration of Metamizole may lead to falsely low results. Testing should be performed immediately prior to Metamizole dosing.    HDL-Cholesterol 12/20/2023 45.0  mg/dL Final      Age       Very Low   Low     Normal    High    0-19 Y    < 35      < 40     40-45     ----  20-24 Y    ----     < 40      >45      ----        >24 Y      ----     < 40     40-60      >60      Cholesterol/HDL Ratio 12/20/2023 4.8   Final      Ref Values  Desirable  < 3.4  High Risk  > 5.0    LDL Calculated 12/20/2023 120 (H)  <=99 mg/dL Final                                Near   Borderline      AGE      Desirable  Optimal    High     High     Very High     0-19 Y     0 - 109     ---    110-129   >/= 130     ----     20-24 Y     0 - 119     ---    120-159   >/= 160     ----      >24 Y     0 -  99   100-129  130-159   160-189     >/=190      VLDL 12/20/2023 49 (H)  0 - 40 mg/dL Final    Triglycerides 12/20/2023 244 (H)  0 - 149 mg/dL Final       Age         Desirable   Borderline High   High     Very High   0 D-90 D    19 - 174         ----         ----        ----  91 D- 9 Y     0 -  74        75 -  99     >/= 100      ----    10-19 Y     0 -  89        90 - 129     >/= 130      ----    20-24 Y     0 - 114       115 - 149     >/= 150      ----         >24 Y     0 - 149       150 - 199    200- 499    >/= 500    Venipuncture immediately after or during the administration of Metamizole may lead to falsely low results. Testing should be performed immediately prior to Metamizole dosing.    Non HDL Cholesterol 12/20/2023 169 (H)  0 - 149 mg/dL Final          Age       Desirable   Borderline High   High     Very High     0-19 Y     0 - 119       120 - 144     >/= 145    >/= 160    20-24 Y     0 - 149       150 - 189     >/= 190      ----         >24 Y    30 mg/dL above LDL Cholesterol goal      Prostate Specific AG 12/20/2023 3.54  <=4.00 ng/mL Final       Assessment/Plan   Problem List Items Addressed This Visit             ICD-10-CM    Hyperlipidemia E78.5    HTN (hypertension) - Primary I10    Depression F32.A     Other Visit Diagnoses         Codes    Controlled type 2 diabetes mellitus with hyperglycemia, with long-term current use of insulin (CMS/[facility])     E11.65, Z79.4          Aim for 30-minute walk per day.  Try to lose weight.  Follow-up in 3 months

## 2024-02-27 ENCOUNTER — TELEPHONE (OUTPATIENT)
Dept: PRIMARY CARE | Facility: CLINIC | Age: 79
End: 2024-02-27
Payer: MEDICARE

## 2024-02-27 NOTE — TELEPHONE ENCOUNTER
Patient is asking for an answer today because he had to order his medicine from [facility] and it is 300.00, Can Dr. Escalera give him something different that doesn't cost as much?  empagliflozin (Jardiance) 25 mg

## 2024-02-28 NOTE — TELEPHONE ENCOUNTER
He can get it from:    [email or web address]    For $88 for a 3 month supply.  Personally that is where I would probably get it from.

## 2024-02-29 ENCOUNTER — TELEPHONE (OUTPATIENT)
Dept: PRIMARY CARE | Facility: CLINIC | Age: 79
End: 2024-02-29
Payer: MEDICARE

## 2024-02-29 NOTE — TELEPHONE ENCOUNTER
Pt called my vm on 2/27 requesting a cb regarding FTS fee from 12/21 and balance from DOS 1/15/24.    Pt did FTS appt on 12/21- appt was made at previous appt.  FTS fee stands.    Pt had OV on 1/15/24 - $121 - that is deduct amount due per insurance.    Called pt on 2/28 - lmom to cb  Called pt and informed of above. JOHANNA

## 2024-03-19 ENCOUNTER — TELEPHONE (OUTPATIENT)
Dept: PRIMARY CARE | Facility: CLINIC | Age: 79
End: 2024-03-19
Payer: MEDICARE

## 2024-03-19 DIAGNOSIS — E11.9 TYPE 2 DIABETES MELLITUS WITHOUT COMPLICATION, WITHOUT LONG-TERM CURRENT USE OF INSULIN (MULTI): ICD-10-CM

## 2024-03-19 NOTE — TELEPHONE ENCOUNTER
Pt called stating he cannot afford pended med  He stated he checked all the options you provided him and he just cannot afford the medicine.   Please advise on how he should proceed Thx

## 2024-03-20 NOTE — TELEPHONE ENCOUNTER
Its ok to hold for now.  If BS start going up which they probably will, ok to titrate upwards on his insulin to cover it.

## 2024-04-16 DIAGNOSIS — Z79.4 CONTROLLED TYPE 2 DIABETES MELLITUS WITH HYPERGLYCEMIA, WITH LONG-TERM CURRENT USE OF INSULIN (MULTI): ICD-10-CM

## 2024-04-16 DIAGNOSIS — E11.65 CONTROLLED TYPE 2 DIABETES MELLITUS WITH HYPERGLYCEMIA, WITH LONG-TERM CURRENT USE OF INSULIN (MULTI): ICD-10-CM

## 2024-04-16 RX ORDER — NAPROXEN SODIUM 220 MG
TABLET ORAL
Qty: 200 EACH | Refills: 3 | Status: SHIPPED | OUTPATIENT
Start: 2024-04-16 | End: 2025-04-16

## 2024-04-16 NOTE — TELEPHONE ENCOUNTER
Pt called rx line at 1234p requesting pended med    Next OV 5/16  Pt will be out this weekend  Pt uses [facility] Thx

## 2024-04-22 DIAGNOSIS — E11.65 TYPE 2 DIABETES MELLITUS WITH HYPERGLYCEMIA, WITH LONG-TERM CURRENT USE OF INSULIN (MULTI): ICD-10-CM

## 2024-04-22 DIAGNOSIS — Z79.4 TYPE 2 DIABETES MELLITUS WITH HYPERGLYCEMIA, WITH LONG-TERM CURRENT USE OF INSULIN (MULTI): ICD-10-CM

## 2024-04-22 RX ORDER — PEN NEEDLE, DIABETIC 29 G X1/2"
NEEDLE, DISPOSABLE MISCELLANEOUS
Qty: 200 EACH | Refills: 1 | Status: SHIPPED | OUTPATIENT
Start: 2024-04-22

## 2024-04-22 NOTE — TELEPHONE ENCOUNTER
----- Message from Saturnino Escobedo sent at 4/19/2024 10:38 PM EDT -----  Regarding: wrong RX ordered  Contact: [phone]  You ordered syringes instead of the needles you usually order.  Please order correct needles from [facility] [place].  [facility] sold me a box so I can continue to take my insulin shots.  They will not take back the syringes. Can you use them?   Respectfully,  Saturnino Escobedo

## 2024-04-22 NOTE — TELEPHONE ENCOUNTER
Appt on 5/16/24  Syringes were sent instead of needles  Ok to send Needles?  Please advise. Thanks. JW

## 2024-04-30 DIAGNOSIS — E11.9 TYPE 2 DIABETES MELLITUS WITHOUT COMPLICATION, WITHOUT LONG-TERM CURRENT USE OF INSULIN (MULTI): Primary | ICD-10-CM

## 2024-04-30 RX ORDER — PEN NEEDLE, DIABETIC 32GX 5/32"
NEEDLE, DISPOSABLE MISCELLANEOUS
Qty: 200 EACH | Refills: 0 | Status: SHIPPED | OUTPATIENT
Start: 2024-04-30

## 2024-05-14 ENCOUNTER — LAB (OUTPATIENT)
Dept: LAB | Facility: LAB | Age: 79
End: 2024-05-14
Payer: MEDICARE

## 2024-05-14 DIAGNOSIS — E11.65 CONTROLLED TYPE 2 DIABETES MELLITUS WITH HYPERGLYCEMIA, WITH LONG-TERM CURRENT USE OF INSULIN (MULTI): ICD-10-CM

## 2024-05-14 DIAGNOSIS — Z79.4 CONTROLLED TYPE 2 DIABETES MELLITUS WITH HYPERGLYCEMIA, WITH LONG-TERM CURRENT USE OF INSULIN (MULTI): ICD-10-CM

## 2024-05-14 LAB
ALBUMIN SERPL BCP-MCNC: 4.4 G/DL (ref 3.4–5)
ALP SERPL-CCNC: 88 U/L (ref 33–136)
ALT SERPL W P-5'-P-CCNC: 28 U/L (ref 10–52)
ANION GAP SERPL CALC-SCNC: 14 MMOL/L (ref 10–20)
AST SERPL W P-5'-P-CCNC: 28 U/L (ref 9–39)
BILIRUB SERPL-MCNC: 0.7 MG/DL (ref 0–1.2)
BUN SERPL-MCNC: 21 MG/DL (ref 6–23)
CALCIUM SERPL-MCNC: 9.2 MG/DL (ref 8.6–10.6)
CHLORIDE SERPL-SCNC: 100 MMOL/L (ref 98–107)
CHOLEST SERPL-MCNC: 165 MG/DL (ref 0–199)
CHOLESTEROL/HDL RATIO: 3.5
CO2 SERPL-SCNC: 29 MMOL/L (ref 21–32)
CREAT SERPL-MCNC: 1.08 MG/DL (ref 0.5–1.3)
EGFRCR SERPLBLD CKD-EPI 2021: 71 ML/MIN/1.73M*2
EST. AVERAGE GLUCOSE BLD GHB EST-MCNC: 148 MG/DL
GLUCOSE SERPL-MCNC: 156 MG/DL (ref 74–99)
HBA1C MFR BLD: 6.8 %
HDLC SERPL-MCNC: 46.5 MG/DL
LDLC SERPL CALC-MCNC: 64 MG/DL
NON HDL CHOLESTEROL: 119 MG/DL (ref 0–149)
POTASSIUM SERPL-SCNC: 4.2 MMOL/L (ref 3.5–5.3)
PROT SERPL-MCNC: 7.8 G/DL (ref 6.4–8.2)
SODIUM SERPL-SCNC: 139 MMOL/L (ref 136–145)
TRIGL SERPL-MCNC: 275 MG/DL (ref 0–149)
VLDL: 55 MG/DL (ref 0–40)

## 2024-05-14 PROCEDURE — 80061 LIPID PANEL: CPT

## 2024-05-14 PROCEDURE — 36415 COLL VENOUS BLD VENIPUNCTURE: CPT

## 2024-05-14 PROCEDURE — 83036 HEMOGLOBIN GLYCOSYLATED A1C: CPT

## 2024-05-14 PROCEDURE — 80053 COMPREHEN METABOLIC PANEL: CPT

## 2024-05-16 ENCOUNTER — OFFICE VISIT (OUTPATIENT)
Dept: PRIMARY CARE | Facility: CLINIC | Age: 79
End: 2024-05-16
Payer: MEDICARE

## 2024-05-16 VITALS
TEMPERATURE: 97.2 F | DIASTOLIC BLOOD PRESSURE: 82 MMHG | BODY MASS INDEX: 34.73 KG/M2 | OXYGEN SATURATION: 98 % | HEART RATE: 64 BPM | SYSTOLIC BLOOD PRESSURE: 126 MMHG | WEIGHT: 196 LBS | HEIGHT: 63 IN

## 2024-05-16 DIAGNOSIS — F32.5 MAJOR DEPRESSIVE DISORDER IN FULL REMISSION, UNSPECIFIED WHETHER RECURRENT (CMS-HCC): ICD-10-CM

## 2024-05-16 DIAGNOSIS — I10 PRIMARY HYPERTENSION: ICD-10-CM

## 2024-05-16 DIAGNOSIS — Z00.00 MEDICARE ANNUAL WELLNESS VISIT, SUBSEQUENT: ICD-10-CM

## 2024-05-16 DIAGNOSIS — E11.21 TYPE 2 DIABETES MELLITUS WITH DIABETIC NEPHROPATHY, UNSPECIFIED WHETHER LONG TERM INSULIN USE (MULTI): Primary | ICD-10-CM

## 2024-05-16 DIAGNOSIS — E11.9 TYPE 2 DIABETES MELLITUS WITHOUT COMPLICATION, WITHOUT LONG-TERM CURRENT USE OF INSULIN (MULTI): ICD-10-CM

## 2024-05-16 DIAGNOSIS — E78.2 MIXED HYPERLIPIDEMIA: ICD-10-CM

## 2024-05-16 PROCEDURE — 1170F FXNL STATUS ASSESSED: CPT | Performed by: FAMILY MEDICINE

## 2024-05-16 PROCEDURE — 1123F ACP DISCUSS/DSCN MKR DOCD: CPT | Performed by: FAMILY MEDICINE

## 2024-05-16 PROCEDURE — 1159F MED LIST DOCD IN RCRD: CPT | Performed by: FAMILY MEDICINE

## 2024-05-16 PROCEDURE — G0444 DEPRESSION SCREEN ANNUAL: HCPCS | Performed by: FAMILY MEDICINE

## 2024-05-16 PROCEDURE — G0439 PPPS, SUBSEQ VISIT: HCPCS | Performed by: FAMILY MEDICINE

## 2024-05-16 PROCEDURE — 3074F SYST BP LT 130 MM HG: CPT | Performed by: FAMILY MEDICINE

## 2024-05-16 PROCEDURE — 3079F DIAST BP 80-89 MM HG: CPT | Performed by: FAMILY MEDICINE

## 2024-05-16 PROCEDURE — 99214 OFFICE O/P EST MOD 30 MIN: CPT | Performed by: FAMILY MEDICINE

## 2024-05-16 PROCEDURE — 1036F TOBACCO NON-USER: CPT | Performed by: FAMILY MEDICINE

## 2024-05-16 PROCEDURE — 1158F ADVNC CARE PLAN TLK DOCD: CPT | Performed by: FAMILY MEDICINE

## 2024-05-16 RX ORDER — POTASSIUM CHLORIDE 750 MG/1
10 CAPSULE, EXTENDED RELEASE ORAL DAILY
Qty: 90 CAPSULE | Refills: 3 | Status: SHIPPED | OUTPATIENT
Start: 2024-05-16 | End: 2024-05-24 | Stop reason: ALTCHOICE

## 2024-05-16 ASSESSMENT — PATIENT HEALTH QUESTIONNAIRE - PHQ9
2. FEELING DOWN, DEPRESSED OR HOPELESS: SEVERAL DAYS
SUM OF ALL RESPONSES TO PHQ9 QUESTIONS 1 AND 2: 2
10. IF YOU CHECKED OFF ANY PROBLEMS, HOW DIFFICULT HAVE THESE PROBLEMS MADE IT FOR YOU TO DO YOUR WORK, TAKE CARE OF THINGS AT HOME, OR GET ALONG WITH OTHER PEOPLE: NOT DIFFICULT AT ALL
1. LITTLE INTEREST OR PLEASURE IN DOING THINGS: SEVERAL DAYS

## 2024-05-16 ASSESSMENT — ACTIVITIES OF DAILY LIVING (ADL)
DOING_HOUSEWORK: INDEPENDENT
DRESSING: INDEPENDENT
TAKING_MEDICATION: INDEPENDENT
BATHING: INDEPENDENT
GROCERY_SHOPPING: INDEPENDENT
MANAGING_FINANCES: INDEPENDENT

## 2024-05-16 ASSESSMENT — ENCOUNTER SYMPTOMS: HYPERTENSION: 1

## 2024-05-16 NOTE — PROGRESS NOTES
Subjective   Patient ID: Saturnino Escobedo is a 78 y.o. male who presents for Hypertension, Hyperlipidemia (Recheck, review labs.), and Medicare Annual Wellness Visit Subsequent.  Hypertension    Hyperlipidemia      HPI: Annual Wellness visit. The patient has felt depressed over the past 6 weeks, tolerating Cymbalta well. No trouble with bathing, dressing, eating, grooming, walking, toileting, house work, managing money or transportation. Reports no falls. The home has functioning smoke alarms, grab bars in the bathroom and handrails on the stairs. The home does not have poor lighting. Reports hearing difficulty in the ears bilaterally. Currently following a diabetic/low calorie diet.   Has had some urinary incontinence. Has noticed some recent issues with memory, using techniques like putting all his valuables in the same place.     1. lipids: well controlled     2. anxiety: doing well overall.       3. HTN: weight is good, stable     4. DM2: Improving diet.  A1c was 6.8. Stopped Jardiance due to cost. BG ranges from 120-180. Averages to 150     5. OA: stable    Counseled pt on living will: has living will    AAA screening: NA     Prostate CA screen:     CV screening: checking CA score    Colonoscopy: done in 2021 and normal    Diabetes screening: treated    Glaucoma screen:  sees optho, last appointment 6 months ago    CT chest tob screen: NA    Vaccines:      Current Outpatient Medications on File Prior to Visit   Medication Sig Dispense Refill    amLODIPine (Norvasc) 10 mg tablet Take 1 tablet (10 mg) by mouth once daily. 90 tablet 3    amoxicillin (Amoxil) 500 mg capsule Take 4 capsules (2,000 mg) by mouth. 1 HOUR PRIOR TO DENTAL APPT.      aspirin 81 mg capsule Take by mouth.      aspirin 81 mg EC tablet Take 1 tablet (81 mg) by mouth once daily.      baclofen (Lioresal) 10 mg tablet Take 0.5-1 tablets (5-10 mg) by mouth 3 times a day as needed.      chlorhexidine (Peridex) 0.12 % solution Use 15 mL in the mouth  "or throat.  ONCE A DAY NIGHT PRIOR TO SURGERY AND MORNING OF SURGERY      dorzolamide-timoloL (Cosopt) 22.3-6.8 mg/mL ophthalmic solution Administer into affected eye(s). Dorzolamide HCl-Timolol Mal 22.3-6.8 MG/ML Ophthalmic Solution Quantity: 10 Refills: 0 Start : 21-Apr-2022 Active      DULoxetine (Cymbalta) 60 mg DR capsule Take 1 capsule (60 mg) by mouth once daily. 90 capsule 3    famotidine (Pepcid) 20 mg tablet Take by mouth.      fish oil concentrate (Omega-3) 120-180 mg capsule       flash glucose sensor (FREESTYLE NASREEN 2 SENSOR [facility]) by Does not apply route. disp 1 device to be used [id number]      FreeStyle Nasrene reader (FreeStyle Nasreen 2 [place]) misc disp 1 device      insulin NPH, Isophane, (NovoLIN N FlexPen) 100 unit/mL (3 mL) injection Inject 20 Units under the skin 2 times a day before meals. Take as directed per insulin instructions.  Please dispense Relion version. 36 mL 1    insulin syringe-needle U-100 31G X 5/16\" 0.5 mL syringe Use to inject 1-4 times daily as directed. 200 each 3    latanoprost (Xalatan) 0.005 % ophthalmic solution Administer into affected eye(s).      losartan-hydrochlorothiazide (Hyzaar) 100-25 mg tablet Take 1 tablet by mouth once daily. 90 tablet 3    metFORMIN (Glucophage) 500 mg tablet Take 1 tablet (500 mg) by mouth 2 times a day with meals. 180 tablet 3    metoprolol succinate XL (Toprol-XL) 50 mg 24 hr tablet Take 1 tablet (50 mg) by mouth once daily.      metoprolol tartrate (Lopressor) 50 mg tablet Take 1 tablet (50 mg) by mouth 2 times a day. 180 tablet 3    MULTIVITAMIN ORAL Take by mouth once daily.      naproxen (Naprosyn) 250 mg tablet Take 1 tablet (250 mg) by mouth 2 times daily (morning and late afternoon). For pain      naproxen (Naprosyn) 500 mg tablet Take 1 tablet (500 mg) by mouth 2 times a day.      oxyCODONE (Roxicodone) 5 mg immediate release tablet       pen needle, diabetic 31 gauge x 1/4\" needle To use bid daily 200 each 1    pen needle, diabetic 31 " "gauge x 15/64\" needle USE  TWICE DAILY 200 each 0    rosuvastatin (Crestor) 20 mg tablet Take 1 tablet (20 mg) by mouth once daily. 90 tablet 1    timoloL maleate 0.5 % drops, once daily Administer into affected eye(s).      [DISCONTINUED] empagliflozin (Jardiance) 25 mg Take 1 tablet (25 mg) by mouth once daily. 90 tablet 3    [DISCONTINUED] potassium chloride ER (Micro-K) 10 mEq ER capsule Take 1 capsule (10 mEq) by mouth once daily. 90 capsule 3    buPROPion XL (Wellbutrin XL) 150 mg 24 hr tablet Take 1 tablet (150 mg) by mouth once daily in the morning. Do not crush, chew, or split. 90 tablet 1     No current facility-administered medications on file prior to visit.        Vitals:    05/16/24 1017   BP: 126/82   Pulse: 64   Temp: 36.2 °C (97.2 °F)   SpO2: 98%       Review of Systems   All other systems reviewed and are negative.      Objective     Physical Exam  Vitals reviewed.   Constitutional:       General: He is not in acute distress.     Appearance: Normal appearance. He is well-developed. He is not diaphoretic.   HENT:      Head: Normocephalic and atraumatic.      Right Ear: Tympanic membrane normal.      Left Ear: Tympanic membrane normal.      Nose: Nose normal.      Mouth/Throat:      Mouth: Mucous membranes are moist.   Eyes:      Pupils: Pupils are equal, round, and reactive to light.   Cardiovascular:      Rate and Rhythm: Normal rate and regular rhythm.      Heart sounds: Normal heart sounds. No murmur heard.     No friction rub. No gallop.   Pulmonary:      Effort: Pulmonary effort is normal.      Breath sounds: Normal breath sounds. No rales.   Abdominal:      General: Bowel sounds are normal.      Palpations: Abdomen is soft.      Tenderness: There is no abdominal tenderness.   Musculoskeletal:      Cervical back: Normal range of motion and neck supple.   Skin:     General: Skin is warm and dry.   Neurological:      Mental Status: He is alert.   Psychiatric:         Mood and Affect: Mood normal. "         Lab on 05/14/2024   Component Date Value Ref Range Status    Glucose 05/14/2024 156 (H)  74 - 99 mg/dL Final    Sodium 05/14/2024 139  136 - 145 mmol/L Final    Potassium 05/14/2024 4.2  3.5 - 5.3 mmol/L Final    Chloride 05/14/2024 100  98 - 107 mmol/L Final    Bicarbonate 05/14/2024 29  21 - 32 mmol/L Final    Anion Gap 05/14/2024 14  10 - 20 mmol/L Final    Urea Nitrogen 05/14/2024 21  6 - 23 mg/dL Final    Creatinine 05/14/2024 1.08  0.50 - 1.30 mg/dL Final    eGFR 05/14/2024 71  >60 mL/min/1.73m*2 Final    Calculations of estimated GFR are performed using the 2021 CKD-EPI Study Refit equation without the race variable for the IDMS-Traceable creatinine methods.  https://jasn.asnjournals.org/content/early/2021/09/22/ASN.5948471217    Calcium 05/14/2024 9.2  8.6 - 10.6 mg/dL Final    Albumin 05/14/2024 4.4  3.4 - 5.0 g/dL Final    Alkaline Phosphatase 05/14/2024 88  33 - 136 U/L Final    Total Protein 05/14/2024 7.8  6.4 - 8.2 g/dL Final    AST 05/14/2024 28  9 - 39 U/L Final    Bilirubin, Total 05/14/2024 0.7  0.0 - 1.2 mg/dL Final    ALT 05/14/2024 28  10 - 52 U/L Final    Patients treated with Sulfasalazine may generate falsely decreased results for ALT.    Cholesterol 05/14/2024 165  0 - 199 mg/dL Final          Age      Desirable   Borderline High   High     0-19 Y     0 - 169       170 - 199     >/= 200    20-24 Y     0 - 189       190 - 224     >/= 225         >24 Y     0 - 199       200 - 239     >/= 240   **All ranges are based on fasting samples. Specific   therapeutic targets will vary based on patient-specific   cardiac risk.    Pediatric guidelines reference:Pediatrics 2011, 128(S5).Adult guidelines reference: NCEP ATPIII Guidelines,FANNY 2001, 258:2486-97    Venipuncture immediately after or during the administration of Metamizole may lead to falsely low results. Testing should be performed immediately prior to Metamizole dosing.    HDL-Cholesterol 05/14/2024 46.5  mg/dL Final      Age        Very Low   Low     Normal    High    0-19 Y    < 35      < 40     40-45     ----  20-24 Y    ----     < 40      >45      ----        >24 Y      ----     < 40     40-60      >60      Cholesterol/HDL Ratio 05/14/2024 3.5   Final      Ref Values  Desirable  < 3.4  High Risk  > 5.0    LDL Calculated 05/14/2024 64  <=99 mg/dL Final                                Near   Borderline      AGE      Desirable  Optimal    High     High     Very High     0-19 Y     0 - 109     ---    110-129   >/= 130     ----    20-24 Y     0 - 119     ---    120-159   >/= 160     ----      >24 Y     0 -  99   100-129  130-159   160-189     >/=190      VLDL 05/14/2024 55 (H)  0 - 40 mg/dL Final    Triglycerides 05/14/2024 275 (H)  0 - 149 mg/dL Final       Age         Desirable   Borderline High   High     Very High   0 D-90 D    19 - 174         ----         ----        ----  91 D- 9 Y     0 -  74        75 -  99     >/= 100      ----    10-19 Y     0 -  89        90 - 129     >/= 130      ----    20-24 Y     0 - 114       115 - 149     >/= 150      ----         >24 Y     0 - 149       150 - 199    200- 499    >/= 500    Venipuncture immediately after or during the administration of Metamizole may lead to falsely low results. Testing should be performed immediately prior to Metamizole dosing.    Non HDL Cholesterol 05/14/2024 119  0 - 149 mg/dL Final          Age       Desirable   Borderline High   High     Very High     0-19 Y     0 - 119       120 - 144     >/= 145    >/= 160    20-24 Y     0 - 149       150 - 189     >/= 190      ----         >24 Y    30 mg/dL above LDL Cholesterol goal      Hemoglobin A1C 05/14/2024 6.8 (H)  see below % Final    Estimated Average Glucose 05/14/2024 148  Not Established mg/dL Final       Assessment/Plan   Problem List Items Addressed This Visit       Type 2 diabetes mellitus (Multi) - Primary    Relevant Medications    empagliflozin (Jardiance) 25 mg    Hyperlipidemia    HTN (hypertension)    Relevant  Medications    potassium chloride ER (Micro-K) 10 mEq ER capsule    Depression     Other Visit Diagnoses       Medicare annual wellness visit, subsequent              Health stable  Medications refilled  Follow up in 4 months

## 2024-05-23 ENCOUNTER — TELEPHONE (OUTPATIENT)
Dept: PRIMARY CARE | Facility: CLINIC | Age: 79
End: 2024-05-23
Payer: MEDICARE

## 2024-05-23 NOTE — TELEPHONE ENCOUNTER
Pt questioning if he's supposed to take potassium that was sent in recently... questions if this is for his wife.

## 2024-05-28 NOTE — TELEPHONE ENCOUNTER
Called pt, he states he has never taken Pot, so he should not take it? Please confirm. Ok ot wait for [facility] next week Thx

## 2024-06-04 NOTE — PROGRESS NOTES
Subjective   Patient ID: Saturnino Escobedo is a 78 y.o. male who presents for No chief complaint on file..  HPI    Patient Active Problem List   Diagnosis    Type 2 diabetes mellitus (Multi)    S/P knee replacement    Right shoulder pain    Pain in joint, lower leg    Osteoarthritis of right glenohumeral joint    Nuclear senile cataract    Nocturnal enuresis    Lumbosacral spondylosis without myelopathy    Degeneration of lumbar or lumbosacral intervertebral disc    Impaired fasting glucose    Hx of LASIK    Hyperlipidemia    HTN (hypertension)    Generalized osteoarthritis    Functional disorder of stomach    DJD (degenerative joint disease)    Osteoarthritis of knee    Depression    Carpal tunnel syndrome    Benign prostatic hyperplasia    Benign essential hypertension    Anxiety state    Acquired spondylolisthesis    Umbilical hernia    Recurrent major depression in full remission (CMS-[facility])       Social Connections: Not on file       Current Outpatient Medications on File Prior to Visit   Medication Sig Dispense Refill    amLODIPine (Norvasc) 10 mg tablet Take 1 tablet (10 mg) by mouth once daily. 90 tablet 3    amoxicillin (Amoxil) 500 mg capsule Take 4 capsules (2,000 mg) by mouth. 1 HOUR PRIOR TO DENTAL APPT.      aspirin 81 mg capsule Take by mouth.      aspirin 81 mg EC tablet Take 1 tablet (81 mg) by mouth once daily.      baclofen (Lioresal) 10 mg tablet Take 0.5-1 tablets (5-10 mg) by mouth 3 times a day as needed.      buPROPion XL (Wellbutrin XL) 150 mg 24 hr tablet Take 1 tablet (150 mg) by mouth once daily in the morning. Do not crush, chew, or split. 90 tablet 1    chlorhexidine (Peridex) 0.12 % solution Use 15 mL in the mouth or throat.  ONCE A DAY NIGHT PRIOR TO SURGERY AND MORNING OF SURGERY      dorzolamide-timoloL (Cosopt) 22.3-6.8 mg/mL ophthalmic solution Administer into affected eye(s). Dorzolamide HCl-Timolol Mal 22.3-6.8 MG/ML Ophthalmic Solution Quantity: 10 Refills: 0 Start : 21-Apr-2022  "Active      DULoxetine (Cymbalta) 60 mg DR capsule Take 1 capsule (60 mg) by mouth once daily. 90 capsule 3    empagliflozin (Jardiance) 25 mg Take 1 tablet (25 mg) by mouth once daily. 90 tablet 3    famotidine (Pepcid) 20 mg tablet Take by mouth.      fish oil concentrate (Omega-3) 120-180 mg capsule       flash glucose sensor (FREESTYLE NASREEN 2 SENSOR [facility] [facility] by Does not apply route. disp 1 device to be used [id number]      FreeStyle Nasreen reader (FreeStyle Nasreen 2 [place]) misc disp 1 device      insulin NPH, Isophane, (NovoLIN N FlexPen) 100 unit/mL (3 mL) injection Inject 20 Units under the skin 2 times a day before meals. Take as directed per insulin instructions.  Please dispense Relion version. 36 mL 1    insulin syringe-needle U-100 31G X 5/16\" 0.5 mL syringe Use to inject 1-4 times daily as directed. 200 each 3    latanoprost (Xalatan) 0.005 % ophthalmic solution Administer into affected eye(s).      losartan-hydrochlorothiazide (Hyzaar) 100-25 mg tablet Take 1 tablet by mouth once daily. 90 tablet 3    metFORMIN (Glucophage) 500 mg tablet Take 1 tablet (500 mg) by mouth 2 times a day with meals. 180 tablet 3    metoprolol succinate XL (Toprol-XL) 50 mg 24 hr tablet Take 1 tablet (50 mg) by mouth once daily.      metoprolol tartrate (Lopressor) 50 mg tablet Take 1 tablet (50 mg) by mouth 2 times a day. 180 tablet 3    MULTIVITAMIN ORAL Take by mouth once daily.      naproxen (Naprosyn) 250 mg tablet Take 1 tablet (250 mg) by mouth 2 times daily (morning and late afternoon). For pain      naproxen (Naprosyn) 500 mg tablet Take 1 tablet (500 mg) by mouth 2 times a day.      oxyCODONE (Roxicodone) 5 mg immediate release tablet       pen needle, diabetic 31 gauge x 1/4\" needle To use bid daily 200 each 1    pen needle, diabetic 31 gauge x 15/64\" needle USE  TWICE DAILY 200 each 0    rosuvastatin (Crestor) 20 mg tablet Take 1 tablet (20 mg) by mouth once daily. 90 tablet 1    timoloL maleate 0.5 % drops, once daily " Administer into affected eye(s).       No current facility-administered medications on file prior to visit.        There were no vitals filed for this visit.  There were no vitals filed for this visit.    Review of Systems    Objective     Physical Exam    Lab on 05/14/2024   Component Date Value Ref Range Status    Glucose 05/14/2024 156 (H)  74 - 99 mg/dL Final    Sodium 05/14/2024 139  136 - 145 mmol/L Final    Potassium 05/14/2024 4.2  3.5 - 5.3 mmol/L Final    Chloride 05/14/2024 100  98 - 107 mmol/L Final    Bicarbonate 05/14/2024 29  21 - 32 mmol/L Final    Anion Gap 05/14/2024 14  10 - 20 mmol/L Final    Urea Nitrogen 05/14/2024 21  6 - 23 mg/dL Final    Creatinine 05/14/2024 1.08  0.50 - 1.30 mg/dL Final    eGFR 05/14/2024 71  >60 mL/min/1.73m*2 Final    Calculations of estimated GFR are performed using the 2021 CKD-EPI Study Refit equation without the race variable for the IDMS-Traceable creatinine methods.  https://jasn.asnjournals.org/content/early/2021/09/22/ASN.0383608273    Calcium 05/14/2024 9.2  8.6 - 10.6 mg/dL Final    Albumin 05/14/2024 4.4  3.4 - 5.0 g/dL Final    Alkaline Phosphatase 05/14/2024 88  33 - 136 U/L Final    Total Protein 05/14/2024 7.8  6.4 - 8.2 g/dL Final    AST 05/14/2024 28  9 - 39 U/L Final    Bilirubin, Total 05/14/2024 0.7  0.0 - 1.2 mg/dL Final    ALT 05/14/2024 28  10 - 52 U/L Final    Patients treated with Sulfasalazine may generate falsely decreased results for ALT.    Cholesterol 05/14/2024 165  0 - 199 mg/dL Final          Age      Desirable   Borderline High   High     0-19 Y     0 - 169       170 - 199     >/= 200    20-24 Y     0 - 189       190 - 224     >/= 225         >24 Y     0 - 199       200 - 239     >/= 240   **All ranges are based on fasting samples. Specific   therapeutic targets will vary based on patient-specific   cardiac risk.    Pediatric guidelines reference:Pediatrics 2011, 128(S5).Adult guidelines reference: NCEP ATPIII Guidelines,FANNY 2001,  258:2486-97    Venipuncture immediately after or during the administration of Metamizole may lead to falsely low results. Testing should be performed immediately prior to Metamizole dosing.    HDL-Cholesterol 05/14/2024 46.5  mg/dL Final      Age       Very Low   Low     Normal    High    0-19 Y    < 35      < 40     40-45     ----  20-24 Y    ----     < 40      >45      ----        >24 Y      ----     < 40     40-60      >60      Cholesterol/HDL Ratio 05/14/2024 3.5   Final      Ref Values  Desirable  < 3.4  High Risk  > 5.0    LDL Calculated 05/14/2024 64  <=99 mg/dL Final                                Near   Borderline      AGE      Desirable  Optimal    High     High     Very High     0-19 Y     0 - 109     ---    110-129   >/= 130     ----    20-24 Y     0 - 119     ---    120-159   >/= 160     ----      >24 Y     0 -  99   100-129  130-159   160-189     >/=190      VLDL 05/14/2024 55 (H)  0 - 40 mg/dL Final    Triglycerides 05/14/2024 275 (H)  0 - 149 mg/dL Final       Age         Desirable   Borderline High   High     Very High   0 D-90 D    19 - 174         ----         ----        ----  91 D- 9 Y     0 -  74        75 -  99     >/= 100      ----    10-19 Y     0 -  89        90 - 129     >/= 130      ----    20-24 Y     0 - 114       115 - 149     >/= 150      ----         >24 Y     0 - 149       150 - 199    200- 499    >/= 500    Venipuncture immediately after or during the administration of Metamizole may lead to falsely low results. Testing should be performed immediately prior to Metamizole dosing.    Non HDL Cholesterol 05/14/2024 119  0 - 149 mg/dL Final          Age       Desirable   Borderline High   High     Very High     0-19 Y     0 - 119       120 - 144     >/= 145    >/= 160    20-24 Y     0 - 149       150 - 189     >/= 190      ----         >24 Y    30 mg/dL above LDL Cholesterol goal      Hemoglobin A1C 05/14/2024 6.8 (H)  see below % Final    Estimated Average Glucose 05/14/2024 148  Not  Established mg/dL Final       Assessment/Plan

## 2024-06-24 DIAGNOSIS — E11.65 CONTROLLED TYPE 2 DIABETES MELLITUS WITH HYPERGLYCEMIA, WITH LONG-TERM CURRENT USE OF INSULIN (MULTI): ICD-10-CM

## 2024-06-24 DIAGNOSIS — Z79.4 CONTROLLED TYPE 2 DIABETES MELLITUS WITH HYPERGLYCEMIA, WITH LONG-TERM CURRENT USE OF INSULIN (MULTI): ICD-10-CM

## 2024-06-24 RX ORDER — HUMAN INSULIN 100 [IU]/ML
INJECTION, SUSPENSION SUBCUTANEOUS
Qty: 36 ML | Refills: 1 | Status: SHIPPED | OUTPATIENT
Start: 2024-06-24

## 2024-07-22 ENCOUNTER — HOSPITAL ENCOUNTER (OUTPATIENT)
Dept: RADIOLOGY | Facility: HOSPITAL | Age: 79
Discharge: HOME | End: 2024-07-22
Payer: MEDICARE

## 2024-07-22 ENCOUNTER — HOSPITAL ENCOUNTER (OUTPATIENT)
Dept: RADIOLOGY | Facility: CLINIC | Age: 79
Discharge: HOME | End: 2024-07-22
Payer: MEDICARE

## 2024-07-22 DIAGNOSIS — M25.551 RIGHT HIP PAIN: ICD-10-CM

## 2024-07-22 DIAGNOSIS — M25.561 ACUTE PAIN OF RIGHT KNEE: ICD-10-CM

## 2024-07-22 PROCEDURE — 73502 X-RAY EXAM HIP UNI 2-3 VIEWS: CPT | Mod: RIGHT SIDE | Performed by: STUDENT IN AN ORGANIZED HEALTH CARE EDUCATION/TRAINING PROGRAM

## 2024-07-22 PROCEDURE — 73502 X-RAY EXAM HIP UNI 2-3 VIEWS: CPT | Mod: RT

## 2024-07-22 PROCEDURE — 73562 X-RAY EXAM OF KNEE 3: CPT | Mod: RT

## 2024-07-22 PROCEDURE — 73562 X-RAY EXAM OF KNEE 3: CPT | Mod: RIGHT SIDE | Performed by: STUDENT IN AN ORGANIZED HEALTH CARE EDUCATION/TRAINING PROGRAM

## 2024-07-25 DIAGNOSIS — E11.9 TYPE 2 DIABETES MELLITUS WITHOUT COMPLICATION, WITHOUT LONG-TERM CURRENT USE OF INSULIN (MULTI): ICD-10-CM

## 2024-07-25 RX ORDER — PEN NEEDLE, DIABETIC 32GX 5/32"
1 NEEDLE, DISPOSABLE MISCELLANEOUS 2 TIMES DAILY
Qty: 200 EACH | Refills: 3 | Status: SHIPPED | OUTPATIENT
Start: 2024-07-25

## 2024-07-25 NOTE — TELEPHONE ENCOUNTER
Pt calledrx line at 1234p requesting a refill on pended med    Next OV 9/12  Pt compliant  [facility]

## 2024-08-12 DIAGNOSIS — I10 PRIMARY HYPERTENSION: ICD-10-CM

## 2024-08-13 RX ORDER — AMLODIPINE BESYLATE 10 MG/1
10 TABLET ORAL DAILY
Qty: 90 TABLET | Refills: 3 | Status: SHIPPED | OUTPATIENT
Start: 2024-08-13

## 2024-09-09 ENCOUNTER — TELEPHONE (OUTPATIENT)
Dept: PRIMARY CARE | Facility: CLINIC | Age: 79
End: 2024-09-09
Payer: MEDICARE

## 2024-09-09 DIAGNOSIS — E78.2 MIXED HYPERLIPIDEMIA: ICD-10-CM

## 2024-09-09 RX ORDER — ROSUVASTATIN CALCIUM 20 MG/1
20 TABLET, COATED ORAL DAILY
Qty: 90 TABLET | Refills: 3 | Status: SHIPPED | OUTPATIENT
Start: 2024-09-09 | End: 2024-09-12 | Stop reason: SDUPTHER

## 2024-09-09 NOTE — TELEPHONE ENCOUNTER
PT is calling requesting a refill on    Rosuvastatin - 1 week worth of pills left    Send to:  [facility]    Next OV: 9/12

## 2024-09-10 ENCOUNTER — LAB (OUTPATIENT)
Dept: LAB | Facility: LAB | Age: 79
End: 2024-09-10
Payer: MEDICARE

## 2024-09-10 DIAGNOSIS — E11.21 TYPE 2 DIABETES MELLITUS WITH DIABETIC NEPHROPATHY, UNSPECIFIED WHETHER LONG TERM INSULIN USE (MULTI): ICD-10-CM

## 2024-09-10 DIAGNOSIS — E11.21 TYPE 2 DIABETES MELLITUS WITH DIABETIC NEPHROPATHY, UNSPECIFIED WHETHER LONG TERM INSULIN USE: Primary | ICD-10-CM

## 2024-09-10 LAB
ALBUMIN SERPL BCP-MCNC: 4.2 G/DL (ref 3.4–5)
ALP SERPL-CCNC: 92 U/L (ref 33–136)
ALT SERPL W P-5'-P-CCNC: 15 U/L (ref 10–52)
ANION GAP SERPL CALC-SCNC: 15 MMOL/L (ref 10–20)
AST SERPL W P-5'-P-CCNC: 18 U/L (ref 9–39)
BILIRUB SERPL-MCNC: 0.6 MG/DL (ref 0–1.2)
BUN SERPL-MCNC: 20 MG/DL (ref 6–23)
CALCIUM SERPL-MCNC: 9.6 MG/DL (ref 8.6–10.6)
CHLORIDE SERPL-SCNC: 104 MMOL/L (ref 98–107)
CHOLEST SERPL-MCNC: 141 MG/DL (ref 0–199)
CHOLESTEROL/HDL RATIO: 2.9
CO2 SERPL-SCNC: 28 MMOL/L (ref 21–32)
CREAT SERPL-MCNC: 1.05 MG/DL (ref 0.5–1.3)
EGFRCR SERPLBLD CKD-EPI 2021: 73 ML/MIN/1.73M*2
EST. AVERAGE GLUCOSE BLD GHB EST-MCNC: 166 MG/DL
GLUCOSE SERPL-MCNC: 97 MG/DL (ref 74–99)
HBA1C MFR BLD: 7.4 %
HDLC SERPL-MCNC: 49.2 MG/DL
LDLC SERPL CALC-MCNC: 64 MG/DL
NON HDL CHOLESTEROL: 92 MG/DL (ref 0–149)
POTASSIUM SERPL-SCNC: 4.1 MMOL/L (ref 3.5–5.3)
PROT SERPL-MCNC: 7.1 G/DL (ref 6.4–8.2)
SODIUM SERPL-SCNC: 143 MMOL/L (ref 136–145)
TRIGL SERPL-MCNC: 141 MG/DL (ref 0–149)
VLDL: 28 MG/DL (ref 0–40)

## 2024-09-10 PROCEDURE — 83036 HEMOGLOBIN GLYCOSYLATED A1C: CPT

## 2024-09-10 PROCEDURE — 80061 LIPID PANEL: CPT

## 2024-09-10 PROCEDURE — 80053 COMPREHEN METABOLIC PANEL: CPT

## 2024-09-10 PROCEDURE — 36415 COLL VENOUS BLD VENIPUNCTURE: CPT

## 2024-09-12 ENCOUNTER — APPOINTMENT (OUTPATIENT)
Dept: PRIMARY CARE | Facility: CLINIC | Age: 79
End: 2024-09-12
Payer: MEDICARE

## 2024-09-12 VITALS
SYSTOLIC BLOOD PRESSURE: 140 MMHG | BODY MASS INDEX: 35.78 KG/M2 | DIASTOLIC BLOOD PRESSURE: 80 MMHG | TEMPERATURE: 97.7 F | WEIGHT: 202 LBS | OXYGEN SATURATION: 96 % | HEART RATE: 52 BPM

## 2024-09-12 DIAGNOSIS — E11.21 TYPE 2 DIABETES MELLITUS WITH DIABETIC NEPHROPATHY, UNSPECIFIED WHETHER LONG TERM INSULIN USE (MULTI): Primary | ICD-10-CM

## 2024-09-12 DIAGNOSIS — E11.9 DIABETES MELLITUS WITHOUT COMPLICATION (MULTI): ICD-10-CM

## 2024-09-12 DIAGNOSIS — F32.5 MAJOR DEPRESSIVE DISORDER IN FULL REMISSION, UNSPECIFIED WHETHER RECURRENT (CMS-HCC): ICD-10-CM

## 2024-09-12 DIAGNOSIS — I10 PRIMARY HYPERTENSION: ICD-10-CM

## 2024-09-12 DIAGNOSIS — M25.552 PAIN OF LEFT HIP: ICD-10-CM

## 2024-09-12 DIAGNOSIS — E78.2 MIXED HYPERLIPIDEMIA: ICD-10-CM

## 2024-09-12 DIAGNOSIS — F33.42 RECURRENT MAJOR DEPRESSIVE DISORDER, IN FULL REMISSION (CMS-HCC): ICD-10-CM

## 2024-09-12 PROCEDURE — 3077F SYST BP >= 140 MM HG: CPT | Performed by: FAMILY MEDICINE

## 2024-09-12 PROCEDURE — 1036F TOBACCO NON-USER: CPT | Performed by: FAMILY MEDICINE

## 2024-09-12 PROCEDURE — 1159F MED LIST DOCD IN RCRD: CPT | Performed by: FAMILY MEDICINE

## 2024-09-12 PROCEDURE — 1123F ACP DISCUSS/DSCN MKR DOCD: CPT | Performed by: FAMILY MEDICINE

## 2024-09-12 PROCEDURE — 3079F DIAST BP 80-89 MM HG: CPT | Performed by: FAMILY MEDICINE

## 2024-09-12 PROCEDURE — 99214 OFFICE O/P EST MOD 30 MIN: CPT | Performed by: FAMILY MEDICINE

## 2024-09-12 RX ORDER — METOPROLOL TARTRATE 50 MG/1
50 TABLET ORAL 2 TIMES DAILY
Qty: 180 TABLET | Refills: 3 | Status: SHIPPED | OUTPATIENT
Start: 2024-09-12 | End: 2025-09-12

## 2024-09-12 RX ORDER — DULOXETIN HYDROCHLORIDE 60 MG/1
60 CAPSULE, DELAYED RELEASE ORAL DAILY
Qty: 90 CAPSULE | Refills: 3 | Status: SHIPPED | OUTPATIENT
Start: 2024-09-12 | End: 2025-09-12

## 2024-09-12 RX ORDER — METFORMIN HYDROCHLORIDE 500 MG/1
500 TABLET ORAL
Qty: 180 TABLET | Refills: 3 | Status: SHIPPED | OUTPATIENT
Start: 2024-09-12 | End: 2025-09-12

## 2024-09-12 RX ORDER — ROSUVASTATIN CALCIUM 20 MG/1
20 TABLET, COATED ORAL DAILY
Qty: 90 TABLET | Refills: 3 | Status: SHIPPED | OUTPATIENT
Start: 2024-09-12

## 2024-09-12 ASSESSMENT — PATIENT HEALTH QUESTIONNAIRE - PHQ9
2. FEELING DOWN, DEPRESSED OR HOPELESS: NOT AT ALL
1. LITTLE INTEREST OR PLEASURE IN DOING THINGS: NOT AT ALL
SUM OF ALL RESPONSES TO PHQ9 QUESTIONS 1 AND 2: 0

## 2024-09-12 ASSESSMENT — ENCOUNTER SYMPTOMS: HYPERTENSION: 1

## 2024-09-12 NOTE — PROGRESS NOTES
"Subjective   Patient ID: Saturnino Escobedo is a 79 y.o. male who presents for Hyperlipidemia, Hypertension, and Diabetes (Review bw ).  Hypertension    Hyperlipidemia      1. lipids: well controlled     2. anxiety: doing well overall.       3. HTN: weight is good, stable     4. DM2:   A1c was 7.4 from 6.8.   Stopped Jardiance due to cost. BG ranges from 120-180. Averages to 150     5. OA: stable    6. Leg pain:   6. Memory loss: has been forgetting things a lot.  18/22    Current Outpatient Medications on File Prior to Visit   Medication Sig Dispense Refill    amLODIPine (Norvasc) 10 mg tablet TAKE 1 TABLET EVERY DAY 90 tablet 3    aspirin 81 mg capsule Take by mouth.      aspirin 81 mg EC tablet Take 1 tablet (81 mg) by mouth once daily.      baclofen (Lioresal) 10 mg tablet Take 0.5-1 tablets (5-10 mg) by mouth 3 times a day as needed.      buPROPion XL (Wellbutrin XL) 150 mg 24 hr tablet Take 1 tablet (150 mg) by mouth once daily in the morning. Do not crush, chew, or split. 90 tablet 1    dorzolamide-timoloL (Cosopt) 22.3-6.8 mg/mL ophthalmic solution Administer into affected eye(s). Dorzolamide HCl-Timolol Mal 22.3-6.8 MG/ML Ophthalmic Solution Quantity: 10 Refills: 0 Start : 21-Apr-2022 Active      DULoxetine (Cymbalta) 60 mg DR capsule Take 1 capsule (60 mg) by mouth once daily. 90 capsule 3    famotidine (Pepcid) 20 mg tablet Take by mouth.      fish oil concentrate (Omega-3) 120-180 mg capsule       flash glucose sensor (FREESTYLE [place]REEN 2 SENSOR [facility] [place] Does not apply route. disp 1 device to be used [id number]      FreeStyle Nasreen reader (FreeStyle Nasreen 2 Reform) misc disp 1 device      insulin NPH, Isophane, (NovoLIN N FlexPen) 100 unit/mL (3 mL) injection INJECT 20 UNITS UNDER THE SKIN TWO TIMES DAILY BEFORE MEALS. TAKE AS DIRECTED PER INSULIN INSTRUCTIONS. 36 mL 1    insulin syringe-needle U-100 31G X 5/16\" 0.5 mL syringe Use to inject 1-4 times daily as directed. 200 each 3    latanoprost (Xalatan) 0.005 % " "ophthalmic solution Administer into affected eye(s).      losartan-hydrochlorothiazide (Hyzaar) 100-25 mg tablet Take 1 tablet by mouth once daily. 90 tablet 3    metFORMIN (Glucophage) 500 mg tablet Take 1 tablet (500 mg) by mouth 2 times a day with meals. 180 tablet 3    metoprolol succinate XL (Toprol-XL) 50 mg 24 hr tablet Take 1 tablet (50 mg) by mouth once daily.      MULTIVITAMIN ORAL Take by mouth once daily.      pen needle, diabetic 31 gauge x 1/4\" needle To use bid daily 200 each 1    pen needle, diabetic 31 gauge x 15/64\" needle Inject 1 Needle under the skin 2 times a day. USE  TWICE DAILY 200 each 3    rosuvastatin (Crestor) 20 mg tablet TAKE 1 TABLET ONE TIME DAILY 90 tablet 3    chlorhexidine (Peridex) 0.12 % solution Use 15 mL in the mouth or throat.  ONCE A DAY NIGHT PRIOR TO SURGERY AND MORNING OF SURGERY      empagliflozin (Jardiance) 25 mg Take 1 tablet (25 mg) by mouth once daily. (Patient not taking: Reported on 9/12/2024) 90 tablet 3    metoprolol tartrate (Lopressor) 50 mg tablet Take 1 tablet (50 mg) by mouth 2 times a day. 180 tablet 3    naproxen (Naprosyn) 250 mg tablet Take 1 tablet (250 mg) by mouth 2 times daily (morning and late afternoon). For pain      naproxen (Naprosyn) 500 mg tablet Take 1 tablet (500 mg) by mouth 2 times a day.      oxyCODONE (Roxicodone) 5 mg immediate release tablet       timoloL maleate 0.5 % drops, once daily Administer into affected eye(s).      [DISCONTINUED] amoxicillin (Amoxil) 500 mg capsule Take 4 capsules (2,000 mg) by mouth. 1 HOUR PRIOR TO DENTAL APPT.      [DISCONTINUED] rosuvastatin (Crestor) 20 mg tablet Take 1 tablet (20 mg) by mouth once daily. 90 tablet 1     No current facility-administered medications on file prior to visit.        Vitals:    09/12/24 1022   BP: 140/80   Pulse: 52   Temp: 36.5 °C (97.7 °F)   SpO2: 96%       Review of Systems   All other systems reviewed and are negative.      Objective     Physical Exam  Vitals reviewed. "   Constitutional:       General: He is not in acute distress.     Appearance: Normal appearance. He is well-developed. He is not diaphoretic.   HENT:      Head: Normocephalic and atraumatic.      Right Ear: Tympanic membrane normal.      Left Ear: Tympanic membrane normal.      Nose: Nose normal.      Mouth/Throat:      Mouth: Mucous membranes are moist.   Eyes:      Pupils: Pupils are equal, round, and reactive to light.   Cardiovascular:      Rate and Rhythm: Normal rate and regular rhythm.      Heart sounds: Normal heart sounds. No murmur heard.     No friction rub. No gallop.   Pulmonary:      Effort: Pulmonary effort is normal.      Breath sounds: Normal breath sounds. No rales.   Abdominal:      General: Bowel sounds are normal.      Palpations: Abdomen is soft.      Tenderness: There is no abdominal tenderness.   Musculoskeletal:      Cervical back: Normal range of motion and neck supple.   Skin:     General: Skin is warm and dry.   Neurological:      Mental Status: He is alert.   Psychiatric:         Mood and Affect: Mood normal.         Lab on 09/10/2024   Component Date Value Ref Range Status    Glucose 09/10/2024 97  74 - 99 mg/dL Final    Sodium 09/10/2024 143  136 - 145 mmol/L Final    Potassium 09/10/2024 4.1  3.5 - 5.3 mmol/L Final    Chloride 09/10/2024 104  98 - 107 mmol/L Final    Bicarbonate 09/10/2024 28  21 - 32 mmol/L Final    Anion Gap 09/10/2024 15  10 - 20 mmol/L Final    Urea Nitrogen 09/10/2024 20  6 - 23 mg/dL Final    Creatinine 09/10/2024 1.05  0.50 - 1.30 mg/dL Final    eGFR 09/10/2024 73  >60 mL/min/1.73m*2 Final    Calculations of estimated GFR are performed using the 2021 CKD-EPI Study Refit equation without the race variable for the IDMS-Traceable creatinine methods.  https://jasn.asnjournals.org/content/early/2021/09/22/ASN.8265104198    Calcium 09/10/2024 9.6  8.6 - 10.6 mg/dL Final    Albumin 09/10/2024 4.2  3.4 - 5.0 g/dL Final    Alkaline Phosphatase 09/10/2024 92  33 - 136  U/L Final    Total Protein 09/10/2024 7.1  6.4 - 8.2 g/dL Final    AST 09/10/2024 18  9 - 39 U/L Final    Bilirubin, Total 09/10/2024 0.6  0.0 - 1.2 mg/dL Final    ALT 09/10/2024 15  10 - 52 U/L Final    Patients treated with Sulfasalazine may generate falsely decreased results for ALT.    Hemoglobin A1C 09/10/2024 7.4 (H)  see below % Final    Estimated Average Glucose 09/10/2024 166  Not Established mg/dL Final    Cholesterol 09/10/2024 141  0 - 199 mg/dL Final          Age      Desirable   Borderline High   High     0-19 Y     0 - 169       170 - 199     >/= 200    20-24 Y     0 - 189       190 - 224     >/= 225         >24 Y     0 - 199       200 - 239     >/= 240   **All ranges are based on fasting samples. Specific   therapeutic targets will vary based on patient-specific   cardiac risk.    Pediatric guidelines reference:Pediatrics 2011, 128(S5).Adult guidelines reference: NCEP ATPIII Guidelines,FANNY 2001, 258:2486-97    Venipuncture immediately after or during the administration of Metamizole may lead to falsely low results. Testing should be performed immediately prior to Metamizole dosing.    HDL-Cholesterol 09/10/2024 49.2  mg/dL Final      Age       Very Low   Low     Normal    High    0-19 Y    < 35      < 40     40-45     ----  20-24 Y    ----     < 40      >45      ----        >24 Y      ----     < 40     40-60      >60      Cholesterol/HDL Ratio 09/10/2024 2.9   Final      Ref Values  Desirable  < 3.4  High Risk  > 5.0    LDL Calculated 09/10/2024 64  <=99 mg/dL Final                                Near   Borderline      AGE      Desirable  Optimal    High     High     Very High     0-19 Y     0 - 109     ---    110-129   >/= 130     ----    20-24 Y     0 - 119     ---    120-159   >/= 160     ----      >24 Y     0 -  99   100-129  130-159   160-189     >/=190      VLDL 09/10/2024 28  0 - 40 mg/dL Final    Triglycerides 09/10/2024 141  0 - 149 mg/dL Final       Age         Desirable   Borderline High    High     Very High   0 D-90 D    19 - 174         ----         ----        ----  91 D- 9 Y     0 -  74        75 -  99     >/= 100      ----    10-19 Y     0 -  89        90 - 129     >/= 130      ----    20-24 Y     0 - 114       115 - 149     >/= 150      ----         >24 Y     0 - 149       150 - 199    200- 499    >/= 500    Venipuncture immediately after or during the administration of Metamizole may lead to falsely low results. Testing should be performed immediately prior to Metamizole dosing.    Non HDL Cholesterol 09/10/2024 92  0 - 149 mg/dL Final          Age       Desirable   Borderline High   High     Very High     0-19 Y     0 - 119       120 - 144     >/= 145    >/= 160    20-24 Y     0 - 149       150 - 189     >/= 190      ----         >24 Y    30 mg/dL above LDL Cholesterol goal         Assessment/Plan   Problem List Items Addressed This Visit       Type 2 diabetes mellitus (Multi) - Primary    Relevant Orders    Comprehensive metabolic panel    Lipid panel    Hemoglobin A1c    Hyperlipidemia    HTN (hypertension)    Depression     Health stable  Medications refilled  Follow up in 4 months  Referring to pharmacy to try to help with medication cost.  Walk!!

## 2024-09-19 ENCOUNTER — TELEMEDICINE (OUTPATIENT)
Dept: PHARMACY | Facility: HOSPITAL | Age: 79
End: 2024-09-19
Payer: MEDICARE

## 2024-09-19 DIAGNOSIS — E11.9 TYPE 2 DIABETES MELLITUS WITHOUT COMPLICATION, WITHOUT LONG-TERM CURRENT USE OF INSULIN (MULTI): ICD-10-CM

## 2024-09-19 DIAGNOSIS — E11.21 TYPE 2 DIABETES MELLITUS WITH DIABETIC NEPHROPATHY, UNSPECIFIED WHETHER LONG TERM INSULIN USE: ICD-10-CM

## 2024-09-19 RX ORDER — PEN NEEDLE, DIABETIC 30 GX3/16"
NEEDLE, DISPOSABLE MISCELLANEOUS
Qty: 100 EACH | Refills: 11 | Status: SHIPPED | OUTPATIENT
Start: 2024-09-19

## 2024-09-19 RX ORDER — INSULIN DEGLUDEC 100 U/ML
40 INJECTION, SOLUTION SUBCUTANEOUS DAILY
Qty: 36 ML | Refills: 3 | Status: SHIPPED | OUTPATIENT
Start: 2024-09-19 | End: 2025-09-19

## 2024-09-19 NOTE — ASSESSMENT & PLAN NOTE
Patient's goal A1c is < 7%.  Is pt at goal? No, 7.4%  Patient's SMBGs are variable but generally in target range.     Rationale for plan: Patient struggling to afford Jardiance. Screened for PAP- will submit application to improve affordability of Jardiance. Would also like to switch patient to once daily insulin. Appears Tresiba is preferred by patient's insurance. Will reduce daily dose by 20% to be conservative.    Medication Changes:  STOP  Insulin NPH 25 units BID  START  Tresiba 40 units once daily  Submitted for PAP  Pen needles sent  Jardiance 25 mg once daily  Submitted for PAP    Future Considerations:  Connect to [employer]  Titrate insulin  GLP-1?    Monitoring and Education:  UACR order placed  Will follow closely and titrate new insulin dose  Hypoglycemia education provided today

## 2024-09-19 NOTE — PROGRESS NOTES
Patient ID: Saturnino Escobedo is a 79 y.o. male who presents for Diabetes.    Referring Provider: Bud Escalera MD  PCP: Bud Escalera MD Last visit with PCP: 9/12/24 Next visit with PCP: 1/9/25       Patient Assistance Screening (VAF)  Patient verbally reports monthly or yearly income which is less than 400% federal poverty level  Application for program has been submitted for the following medications:   Jardiance  Tresiba  Patient aware this process may take up to 2 weeks once income documents have been sent to the team.  If approved, medication must be filled through [facility] pharmacy and may be picked up or mailed to patient.   If approved, medication will be billed through insurance, and patient assistance team will pay the copay. This will result in a $0 copay for the patient.  Counseled patient on mechanism of action, side effects, contraindications, and what to do if the patient misses a dose. All patients questions were answered.        Subjective   Treatment Adherence:   Patient did take medications today.   Number of missed doses in last 7 days: very seldom twice/month.      Preferred pharmacy: Mail order  Can patient afford prescribed medications: No, unable to afford Jardiance. Insulin costs him $100 for 2 months    HPI    DIABETES MELLITUS TYPE 2:    Does patient follow with Endocrinology: No  Last optometry exam: couple months ago  Most recent visit in Podiatry: no-- patient denies sores or cuts on feet today. Bottom of feet hurt once in a while. Bought insoles. Not regular.      Current diabetic medications include:  Jardiance 25mg daily (stopped  couple months ago by pt due to cost)  Insulin NPH 25 units BID  Metformin 500mg BID    Clarifications to above regimen: Patient titrated insulin from 20 units to 25  Adverse Effects: none noted    Past diabetic medications include:  None noted    Glucose Readings:  Glucometer/CGM Type: Mango 2    Current home BG readings: All over the place.  Average morning 130 or less. Evening 150-155 before bed. Low as 74. Weekends can be as high as 200- really.     Any episodes of hypoglycemia? No, lowest ever was 74 .  Did patient treat episode of hypoglycemia appropriately? N/A   Has had episode where all of a sudden low when outside working. Nighttime if anything. Hasn't eaten yet. Feels like jelly.     Advised to use Glucose tablets. Protein meal within an hour in event of hypoglycemia  Does pt have proteinuria? unknown. Needs lab    Lifestyle:  Diet: not discussed today  Physical Activity: not discussed today    Secondary Prevention:  Statin? Yes- rosuvastatin 20mg  ACE-I/ARB? Yes- losartan-hydrochlorothiazide 100-25mg daily  Nees new Rx  Aspirin? Yes    Review of Systems      Objective     There were no vitals taken for this visit.   BP Readings from Last 4 Encounters:   09/12/24 140/80   07/15/24 (!) 141/94   05/16/24 126/82   04/08/24 158/90      There were no vitals filed for this visit.     Labs  Lab Results   Component Value Date    BILITOT 0.6 09/10/2024    CALCIUM 9.6 09/10/2024    CO2 28 09/10/2024     09/10/2024    CREATININE 1.05 09/10/2024    GLUCOSE 97 09/10/2024    ALKPHOS 92 09/10/2024    K 4.1 09/10/2024    PROT 7.1 09/10/2024     09/10/2024    AST 18 09/10/2024    ALT 15 09/10/2024    BUN 20 09/10/2024    ANIONGAP 15 09/10/2024    ALBUMIN 4.2 09/10/2024    GFRMALE 66 09/25/2023     Lab Results   Component Value Date    TRIG 141 09/10/2024    CHOL 141 09/10/2024    LDLCALC 64 09/10/2024    HDL 49.2 09/10/2024     Lab Results   Component Value Date    HGBA1C 7.4 (H) 09/10/2024       Current Outpatient Medications   Medication Instructions    amLODIPine (NORVASC) 10 mg, oral, Daily    aspirin 81 mg, oral, Daily    DULoxetine (CYMBALTA) 60 mg, oral, Daily    empagliflozin (JARDIANCE) 25 mg, oral, Daily    fish oil concentrate (Omega-3) 120-180 mg capsule     flash glucose sensor (FREESTYLE NASREEN 2 SENSOR MISC) Does not apply, disp 1  "device to be used [id number]    FreeStyle Mango reader (FreeStyle Mango 2 Buda) misc disp 1 device    insulin degludec (TRESIBA FLEXTOUCH U-100) 40 Units, subcutaneous, Daily, Take as directed per insulin instructions.    insulin syringe-needle U-100 31G X 5/16\" 0.5 mL syringe Use to inject 1-4 times daily as directed.    latanoprost (Xalatan) 0.005 % ophthalmic solution 1 drop, Both Eyes, Nightly    losartan-hydrochlorothiazide (Hyzaar) 100-25 mg tablet 1 tablet, oral, Daily    metFORMIN (GLUCOPHAGE) 500 mg, oral, 2 times daily (morning and late afternoon)    metoprolol tartrate (LOPRESSOR) 50 mg, oral, 2 times daily    MULTIVITAMIN ORAL oral, Daily RT    pen needle, diabetic 32 gauge x 5/32\" needle Use to inject insulin once daily as directed    rosuvastatin (CRESTOR) 20 mg, oral, Daily    timoloL maleate 0.5 % drops, once daily 1 drop, ophthalmic (eye), 2 times daily         Drug Interactions;  None at time of review    Assessment/Plan   Problem List Items Addressed This Visit             ICD-10-CM    Type 2 diabetes mellitus (Multi) E11.9     Patient's goal A1c is < 7%.  Is pt at goal? No, 7.4%  Patient's SMBGs are variable but generally in target range.     Rationale for plan: Patient struggling to afford Jardiance. Screened for PAP- will submit application to improve affordability of Jardiance. Would also like to switch patient to once daily insulin. Appears Tresiba is preferred by patient's insurance. Will reduce daily dose by 20% to be conservative.    Medication Changes:  STOP  Insulin NPH 25 units BID  START  Tresiba 40 units once daily  Submitted for PAP  Pen needles sent  Jardiance 25 mg once daily  Submitted for PAP    Future Considerations:  Connect to Lib[employer]  Titrate insulin  GLP-1?    Monitoring and Education:  UACR order placed  Will follow closely and titrate new insulin dose  Hypoglycemia education provided today           Relevant Medications    empagliflozin (Jardiance) 25 mg    insulin " "degludec (Tresiba FlexTouch U-100) 100 unit/mL (3 mL) injection    pen needle, diabetic 32 gauge x 5/32\" needle    Other Relevant Orders    Follow Up In Clinical Pharmacy    Albumin-Creatinine Ratio, Urine Random    Albumin-Creatinine Ratio, Urine Random         Follow-up: 10/3/24 1:00pm.     Time spent with pt: Total length of time 32 (minutes) of the encounter and more than 50% was spent counseling the patient.    Brittanie Dunn, PharmD    Continue all meds under the continuation of care with the referring provider and clinical pharmacy team.    "

## 2024-09-20 PROCEDURE — RXMED WILLOW AMBULATORY MEDICATION CHARGE

## 2024-09-24 ENCOUNTER — PHARMACY VISIT (OUTPATIENT)
Dept: PHARMACY | Facility: CLINIC | Age: 79
End: 2024-09-24
Payer: COMMERCIAL

## 2024-09-25 ENCOUNTER — PHARMACY VISIT (OUTPATIENT)
Dept: PHARMACY | Facility: CLINIC | Age: 79
End: 2024-09-25

## 2024-09-27 ENCOUNTER — TELEPHONE (OUTPATIENT)
Dept: PHARMACY | Facility: HOSPITAL | Age: 79
End: 2024-09-27
Payer: MEDICARE

## 2024-09-27 NOTE — TELEPHONE ENCOUNTER
Patient called to review instructions for new medications received from  Pharmacy. During last encounter, decided to transition from insulin NPH 25 units BID to Tresiba 40 units once daily and restart Jardiance 25mg once daily.     Reviewed instructions for Jardiance administration and reminded of side effects (dehydration, UTI, hypoglycemia). Patient would like to finish his existing supply of NPH insulin (3 vials remaining) before starting Tresiba. Advised patient to monitor blood sugar closely after starting Jardiance and to reduce NPH dose to 20 units BID if readings drop below 70 mg/dL.    Patient expresses good understanding of hypoglycemia management. Will call me with any issues. Follow-up scheduled next Thursday at 1pm.    Brittanie Dunn, ValeriaD     Pt called in asking which orders to have completed prior to his appt. He does have a lab sheet in front of him.       He needed clarification as to what labs were completed and the instructions went over all of that with him. At the end of the call all questions answered.

## 2024-09-30 ENCOUNTER — APPOINTMENT (OUTPATIENT)
Dept: ORTHOPEDIC SURGERY | Facility: CLINIC | Age: 79
End: 2024-09-30
Payer: MEDICARE

## 2024-09-30 ENCOUNTER — HOSPITAL ENCOUNTER (OUTPATIENT)
Dept: RADIOLOGY | Facility: CLINIC | Age: 79
Discharge: HOME | End: 2024-09-30
Payer: MEDICARE

## 2024-09-30 VITALS — HEIGHT: 63 IN | BODY MASS INDEX: 35.19 KG/M2 | WEIGHT: 198.6 LBS

## 2024-09-30 DIAGNOSIS — M25.561 ARTHRALGIA OF RIGHT LOWER LEG: ICD-10-CM

## 2024-09-30 DIAGNOSIS — M16.11 PRIMARY OSTEOARTHRITIS OF RIGHT HIP: Primary | ICD-10-CM

## 2024-09-30 PROCEDURE — 99204 OFFICE O/P NEW MOD 45 MIN: CPT | Performed by: ORTHOPAEDIC SURGERY

## 2024-09-30 PROCEDURE — 1123F ACP DISCUSS/DSCN MKR DOCD: CPT | Performed by: ORTHOPAEDIC SURGERY

## 2024-09-30 PROCEDURE — 72170 X-RAY EXAM OF PELVIS: CPT | Performed by: RADIOLOGY

## 2024-09-30 PROCEDURE — 1159F MED LIST DOCD IN RCRD: CPT | Performed by: ORTHOPAEDIC SURGERY

## 2024-09-30 PROCEDURE — 72170 X-RAY EXAM OF PELVIS: CPT

## 2024-09-30 NOTE — PROGRESS NOTES
.PRIMARY CARE PHYSICIAN: Bud Escalera MD  REFERRING PROVIDER: No referring provider defined for this encounter.     CONSULT ORTHOPAEDIC: Hip Evaluation        ASSESSMENT & PLAN    IMPRESSION:  1.  Primary arthritis, right hip    PLAN:  Discussed the patient's diagnosis above and reviewed his current imaging with him.  He does have moderate to severe arthritic changes.  He feels that he is currently able to manage his symptoms conservatively with minimal intervention in the form of medications.  We did discuss that if he is able to do this would recommend that he continue to use anti-inflammatories as needed for pain control if he gets a point or symptoms of significant bother him may consider surgical intervention versus a corticosteroid injection.  Otherwise may continue to activities as tolerated and will follow-up as needed.  Patient agreed with this plan of care.      SUBJECTIVE  CHIEF COMPLAINT:   Chief Complaint   Patient presents with    Right Hip - Pain        HPI: Saturnino Escobedo is a 79 y.o. patient. Saturnino Escobedo has had progressive problems with the right hip over the past 6 months. They do not report any trauma. They do report any constant or progressive numbness or tingling in their legs. Their symptoms are interfering with activities which include yard work.  Localizes the pain to the front of the hip and side of the thigh and sometimes radiates down into the knee    FUNCTIONAL STATUS: not limited.  AMBULATORY STATUS: Househould ambulation independent without devices  PREVIOUS TREATMENTS: None  HISTORY OF SURGERY ON AFFECTED HIP(S): No   BACK PAIN REPORTED: Yes       REVIEW OF SYSTEMS  Constitutional: See HPI for pain assessment, No significant weight loss, recent trauma  Cardiovascular: No chest pain, shortness of breath  Respiratory: No difficulty breathing, cough  Gastrointestinal: No nausea, vomiting, diarrhea, constipation  Musculoskeletal: Noted in HPI, positive for pain, restricted motion,  stiffness  Integumentary: No rashes, easy bruising, redness   Neurological: no numbness or tingling in extremities, no gait disturbances   Psychiatric: No mood changes, memory changes, social issues  Heme/Lymph: no excessive swelling, easy bruising, excessive bleeding  ENT: No hearing changes  Eyes: No vision changes    No past medical history on file.     No Known Allergies     Past Surgical History:   Procedure Laterality Date    CHOLECYSTECTOMY      OTHER SURGICAL HISTORY  06/20/2022    Knee replacement    OTHER SURGICAL HISTORY  06/20/2022    Prostate biopsy    OTHER SURGICAL HISTORY  06/20/2022    Hemorrhoidectomy    OTHER SURGICAL HISTORY  06/20/2022    Hernia repair        Family History   Problem Relation Name Age of Onset    Diabetes Mother      Hypertension Mother      Diabetes type II Mother          with long-term current use of insulin    Stroke Father      Glaucoma Father          Social History     Socioeconomic History    Marital status:      Spouse name: Not on file    Number of children: Not on file    Years of education: Not on file    Highest education level: Not on file   Occupational History    Not on file   Tobacco Use    Smoking status: Never    Smokeless tobacco: Never   Vaping Use    Vaping status: Never Used   Substance and Sexual Activity    Alcohol use: Not Currently     Comment: rarely    Drug use: Never    Sexual activity: Not on file   Other Topics Concern    Not on file   Social History Narrative    Not on file     Social Determinants of Health     Financial Resource Strain: Not on file   Food Insecurity: Not on file   Transportation Needs: Not on file   Physical Activity: Not on file   Stress: Not on file   Social Connections: Not on file   Intimate Partner Violence: Not on file   Housing Stability: Not on file        CURRENT MEDICATIONS:   Current Outpatient Medications   Medication Sig Dispense Refill    amLODIPine (Norvasc) 10 mg tablet TAKE 1 TABLET EVERY DAY 90 tablet 3  "   aspirin 81 mg EC tablet Take 1 tablet (81 mg) by mouth once daily.      DULoxetine (Cymbalta) 60 mg DR capsule Take 1 capsule (60 mg) by mouth once daily. 90 capsule 3    empagliflozin (Jardiance) 25 mg Take 1 tablet (25 mg) by mouth once daily. 90 tablet 3    fish oil concentrate (Omega-3) 120-180 mg capsule       flash glucose sensor (FREESTYLE NASREEN 2 SENSOR [facility] by Does not apply route. disp 1 device to be used [id number]      FreeStyle Nasreen reader (FreeStyle Nasreen 2 [place]) misc disp 1 device      insulin degludec (Tresiba FlexTouch U-100) 100 unit/mL (3 mL) injection Inject 40 Units under the skin once daily. Take as directed per insulin instructions. 36 mL 3    insulin syringe-needle U-100 31G X 5/16\" 0.5 mL syringe Use to inject 1-4 times daily as directed. 200 each 3    latanoprost (Xalatan) 0.005 % ophthalmic solution Administer 1 drop into both eyes once daily at bedtime.      losartan-hydrochlorothiazide (Hyzaar) 100-25 mg tablet Take 1 tablet by mouth once daily. 90 tablet 3    metFORMIN (Glucophage) 500 mg tablet Take 1 tablet (500 mg) by mouth 2 times daily (morning and late afternoon). 180 tablet 3    metoprolol tartrate (Lopressor) 50 mg tablet Take 1 tablet by mouth 2 times a day. 180 tablet 3    MULTIVITAMIN ORAL Take by mouth once daily.      pen needle, diabetic 32 gauge x 5/32\" needle Use to inject insulin once daily as directed 100 each 11    rosuvastatin (Crestor) 20 mg tablet Take 1 tablet (20 mg) by mouth once daily. 90 tablet 3    timoloL maleate 0.5 % drops, once daily Administer 1 drop into affected eye(s) 2 times a day.       No current facility-administered medications for this visit.        OBJECTIVE    PHYSICAL EXAM      9/28/2023    10:27 AM 1/15/2024    10:12 AM 4/8/2024     4:11 PM 5/16/2024    10:17 AM 7/15/2024     3:10 PM 9/12/2024    10:22 AM 9/30/2024     9:48 AM   Vitals   Systolic 136 140 158 126 141 140    Diastolic 82 80 90 82 94 80    Heart Rate 62 72 85 64 68 52  " "  Temp 35.3 °C (95.6 °F) 35.8 °C (96.5 °F) 36.7 °C (98.1 °F) 36.2 °C (97.2 °F) 36.8 °C (98.3 °F) 36.5 °C (97.7 °F)    Resp   18  16     Height (in)    1.6 m (5' 3\") 1.6 m (5' 3\")  1.6 m (5' 3\")   Weight (lb) 194 199 199.08 196 199.08 202 198.6   BMI 34.37 kg/m2 35.25 kg/m2 35.26 kg/m2 34.72 kg/m2 35.26 kg/m2 35.78 kg/m2 35.18 kg/m2   BSA (m2) 1.98 m2 2 m2 2 m2 1.99 m2 2 m2 2.02 m2 2 m2   Visit Report Report Report  Report  Report Report      Body mass index is 35.18 kg/m².    GENERAL: A/Ox3, NAD. Appears healthy, well nourished  PSYCHIATRIC: Mood stable, appropriate memory recall  EYES: EOM intact, no scleral icterus  CARDIAC: regular rate  LUNGS: Breathing non-labored  SKIN: no erythema, rashes, or ecchymoses     MUSCULOSKELETAL:  Laterality: right Hip Exam  - ROM, Extension: full, no flexion contracture  - Strength: Abduction 5/5 against resitance, Flexion 5/5  - Palpation: mild TTP along greater trochanter  - Log roll/IR exam: painful and limited motion. Pain with hip flexion past 90 degrees and internal rotation  - Straight leg raise: negative  - EHL/PF/DF motor intact  - Gait: antalgic to arthritic side, negative Trendelenburg gait   - Special Tests: none performed    NEUROVASCULAR:  - Neurovascular Status: sensation intact to light touch distally  - Capillary refill brisk at extremities, Bilateral dorsalis pedis pulse 2+           IMAGING:  Multiple views of the affected right hip(s) demonstrate: Severe arthritic changes with joint space narrowing and subchondral sclerosis.   X-rays were personally reviewed and interpreted by me.  Radiology reports were reviewed by me as well, if readily available at the time.        Elina Randle DO  Attending Surgeon  Joint Replacement and Adult Reconstructive Surgery  [place], [place]                          "

## 2024-09-30 NOTE — LETTER
September 30, 2024     Bud Escalera MD  [address]  [facility] [address]  [facility] [address]    Patient: Saturnino Escobedo   YOB: 1945   Date of Visit: 9/30/2024       Dear Dr. Bud Escalera MD:    Thank you for referring Saturnino Escobedo to me for evaluation. Below are my notes for this consultation.  If you have questions, please do not hesitate to call me. I look forward to following your patient along with you.       Sincerely,     Elina Randle, DO      CC: No Recipients  ______________________________________________________________________________________    .PRIMARY CARE PHYSICIAN: Bud Escalera MD  REFERRING PROVIDER: No referring provider defined for this encounter.     CONSULT ORTHOPAEDIC: Hip Evaluation        ASSESSMENT & PLAN    IMPRESSION:  1.  Primary arthritis, right hip    PLAN:  Discussed the patient's diagnosis above and reviewed his current imaging with him.  He does have moderate to severe arthritic changes.  He feels that he is currently able to manage his symptoms conservatively with minimal intervention in the form of medications.  We did discuss that if he is able to do this would recommend that he continue to use anti-inflammatories as needed for pain control if he gets a point or symptoms of significant bother him may consider surgical intervention versus a corticosteroid injection.  Otherwise may continue to activities as tolerated and will follow-up as needed.  Patient agreed with this plan of care.      SUBJECTIVE  CHIEF COMPLAINT:   Chief Complaint   Patient presents with   • Right Hip - Pain        HPI: Saturnino Escobedo is a 79 y.o. patient. Saturnino Escobedo has had progressive problems with the right hip over the past 6 months. They do not report any trauma. They do report any constant or progressive numbness or tingling in their legs. Their symptoms are interfering with activities which include yard work.  Localizes the pain to the front of the hip and side of the thigh  and sometimes radiates down into the knee    FUNCTIONAL STATUS: not limited.  AMBULATORY STATUS: Househould ambulation independent without devices  PREVIOUS TREATMENTS: None  HISTORY OF SURGERY ON AFFECTED HIP(S): No   BACK PAIN REPORTED: Yes       REVIEW OF SYSTEMS  Constitutional: See HPI for pain assessment, No significant weight loss, recent trauma  Cardiovascular: No chest pain, shortness of breath  Respiratory: No difficulty breathing, cough  Gastrointestinal: No nausea, vomiting, diarrhea, constipation  Musculoskeletal: Noted in HPI, positive for pain, restricted motion, stiffness  Integumentary: No rashes, easy bruising, redness   Neurological: no numbness or tingling in extremities, no gait disturbances   Psychiatric: No mood changes, memory changes, social issues  Heme/Lymph: no excessive swelling, easy bruising, excessive bleeding  ENT: No hearing changes  Eyes: No vision changes    No past medical history on file.     No Known Allergies     Past Surgical History:   Procedure Laterality Date   • CHOLECYSTECTOMY     • OTHER SURGICAL HISTORY  06/20/2022    Knee replacement   • OTHER SURGICAL HISTORY  06/20/2022    Prostate biopsy   • OTHER SURGICAL HISTORY  06/20/2022    Hemorrhoidectomy   • OTHER SURGICAL HISTORY  06/20/2022    Hernia repair        Family History   Problem Relation Name Age of Onset   • Diabetes Mother     • Hypertension Mother     • Diabetes type II Mother          with long-term current use of insulin   • Stroke Father     • Glaucoma Father          Social History     Socioeconomic History   • Marital status:      Spouse name: Not on file   • Number of children: Not on file   • Years of education: Not on file   • Highest education level: Not on file   Occupational History   • Not on file   Tobacco Use   • Smoking status: Never   • Smokeless tobacco: Never   Vaping Use   • Vaping status: Never Used   Substance and Sexual Activity   • Alcohol use: Not Currently     Comment: rarely  "  • Drug use: Never   • Sexual activity: Not on file   Other Topics Concern   • Not on file   Social History Narrative   • Not on file     Social Determinants of Health     Financial Resource Strain: Not on file   Food Insecurity: Not on file   Transportation Needs: Not on file   Physical Activity: Not on file   Stress: Not on file   Social Connections: Not on file   Intimate Partner Violence: Not on file   Housing Stability: Not on file        CURRENT MEDICATIONS:   Current Outpatient Medications   Medication Sig Dispense Refill   • amLODIPine (Norvasc) 10 mg tablet TAKE 1 TABLET EVERY DAY 90 tablet 3   • aspirin 81 mg EC tablet Take 1 tablet (81 mg) by mouth once daily.     • DULoxetine (Cymbalta) 60 mg DR capsule Take 1 capsule (60 mg) by mouth once daily. 90 capsule 3   • empagliflozin (Jardiance) 25 mg Take 1 tablet (25 mg) by mouth once daily. 90 tablet 3   • fish oil concentrate (Omega-3) 120-180 mg capsule      • flash glucose sensor (FREESTYLE NASREEN 2 SENSOR [facility]) by Does not apply route. disp 1 device to be used [id number]     • FreeStyle Nasreen reader (FreeStyle Nasreen 2 [place]) misc disp 1 device     • insulin degludec (Tresiba FlexTouch U-100) 100 unit/mL (3 mL) injection Inject 40 Units under the skin once daily. Take as directed per insulin instructions. 36 mL 3   • insulin syringe-needle U-100 31G X 5/16\" 0.5 mL syringe Use to inject 1-4 times daily as directed. 200 each 3   • latanoprost (Xalatan) 0.005 % ophthalmic solution Administer 1 drop into both eyes once daily at bedtime.     • losartan-hydrochlorothiazide (Hyzaar) 100-25 mg tablet Take 1 tablet by mouth once daily. 90 tablet 3   • metFORMIN (Glucophage) 500 mg tablet Take 1 tablet (500 mg) by mouth 2 times daily (morning and late afternoon). 180 tablet 3   • metoprolol tartrate (Lopressor) 50 mg tablet Take 1 tablet by mouth 2 times a day. 180 tablet 3   • MULTIVITAMIN ORAL Take by mouth once daily.     • pen needle, diabetic 32 gauge x 5/32\" " "needle Use to inject insulin once daily as directed 100 each 11   • rosuvastatin (Crestor) 20 mg tablet Take 1 tablet (20 mg) by mouth once daily. 90 tablet 3   • timoloL maleate 0.5 % drops, once daily Administer 1 drop into affected eye(s) 2 times a day.       No current facility-administered medications for this visit.        OBJECTIVE    PHYSICAL EXAM      9/28/2023    10:27 AM 1/15/2024    10:12 AM 4/8/2024     4:11 PM 5/16/2024    10:17 AM 7/15/2024     3:10 PM 9/12/2024    10:22 AM 9/30/2024     9:48 AM   Vitals   Systolic 136 140 158 126 141 140    Diastolic 82 80 90 82 94 80    Heart Rate 62 72 85 64 68 52    Temp 35.3 °C (95.6 °F) 35.8 °C (96.5 °F) 36.7 °C (98.1 °F) 36.2 °C (97.2 °F) 36.8 °C (98.3 °F) 36.5 °C (97.7 °F)    Resp   18  16     Height (in)    1.6 m (5' 3\") 1.6 m (5' 3\")  1.6 m (5' 3\")   Weight (lb) 194 199 199.08 196 199.08 202 198.6   BMI 34.37 kg/m2 35.25 kg/m2 35.26 kg/m2 34.72 kg/m2 35.26 kg/m2 35.78 kg/m2 35.18 kg/m2   BSA (m2) 1.98 m2 2 m2 2 m2 1.99 m2 2 m2 2.02 m2 2 m2   Visit Report Report Report  Report  Report Report      Body mass index is 35.18 kg/m².    GENERAL: A/Ox3, NAD. Appears healthy, well nourished  PSYCHIATRIC: Mood stable, appropriate memory recall  EYES: EOM intact, no scleral icterus  CARDIAC: regular rate  LUNGS: Breathing non-labored  SKIN: no erythema, rashes, or ecchymoses     MUSCULOSKELETAL:  Laterality: right Hip Exam  - ROM, Extension: full, no flexion contracture  - Strength: Abduction 5/5 against resitance, Flexion 5/5  - Palpation: mild TTP along greater trochanter  - Log roll/IR exam: painful and limited motion. Pain with hip flexion past 90 degrees and internal rotation  - Straight leg raise: negative  - EHL/PF/DF motor intact  - Gait: antalgic to arthritic side, negative Trendelenburg gait   - Special Tests: none performed    NEUROVASCULAR:  - Neurovascular Status: sensation intact to light touch distally  - Capillary refill brisk at extremities, Bilateral " dorsalis pedis pulse 2+           IMAGING:  Multiple views of the affected right hip(s) demonstrate: Severe arthritic changes with joint space narrowing and subchondral sclerosis.   X-rays were personally reviewed and interpreted by me.  Radiology reports were reviewed by me as well, if readily available at the time.        Elina Randle DO  Attending Surgeon  Joint Replacement and Adult Reconstructive Surgery  [place], [place]

## 2024-10-03 ENCOUNTER — APPOINTMENT (OUTPATIENT)
Dept: PHARMACY | Facility: HOSPITAL | Age: 79
End: 2024-10-03
Payer: MEDICARE

## 2024-10-03 DIAGNOSIS — E11.9 TYPE 2 DIABETES MELLITUS WITHOUT COMPLICATION, WITHOUT LONG-TERM CURRENT USE OF INSULIN (MULTI): ICD-10-CM

## 2024-10-03 RX ORDER — BLOOD-GLUCOSE,RECEIVER,CONT
EACH MISCELLANEOUS
Qty: 1 EACH | Refills: 0 | Status: SHIPPED | OUTPATIENT
Start: 2024-10-03 | End: 2024-10-03 | Stop reason: WASHOUT

## 2024-10-03 RX ORDER — BLOOD-GLUCOSE SENSOR
EACH MISCELLANEOUS
Qty: 6 EACH | Refills: 3 | Status: SHIPPED | OUTPATIENT
Start: 2024-10-03 | End: 2024-10-03 | Stop reason: WASHOUT

## 2024-10-03 RX ORDER — FLASH GLUCOSE SENSOR
KIT MISCELLANEOUS
Qty: 6 EACH | Refills: 3 | Status: SHIPPED | OUTPATIENT
Start: 2024-10-03

## 2024-10-03 RX ORDER — FLASH GLUCOSE SCANNING READER
EACH MISCELLANEOUS
Qty: 1 EACH | Refills: 0 | Status: SHIPPED | OUTPATIENT
Start: 2024-10-03

## 2024-10-03 NOTE — ASSESSMENT & PLAN NOTE
Patient's goal A1c is < 7%.  Is pt at goal? No, 7.4%  Patient's SMBGs are variable but generally in target range.     Rationale for plan: Patient restarted Jardiance 25mg ~1 week ago. Received Tresiba, but opted to finish his supply of NPH before transitioning. Has ~15 days left of NPH then will start Tresiba 40 units daily to reduce number of daily injections. Patient is also interested in CGM. Insurance prefers Mango 2.    Medication Changes:  STOP  Insulin NPH 25 units BID  START  Tresiba 40 units once daily  (Will transition in 2 weeks)  Jardiance 25 mg once daily  Submitted for PAP    Future Considerations:  Titrate insulin  GLP-1?    Monitoring and Education:  UACR order placed  Will follow closely and titrate new insulin dose  Hypoglycemia education provided today  Freestyle Mango 2 prescription sent. Will guide patient through set-up in 2 weeks.

## 2024-10-03 NOTE — PROGRESS NOTES
Patient ID: Saturnino Escobedo is a 79 y.o. male who presents for No chief complaint on file..    Referring Provider: Bud Escalera MD  PCP: Bud Escalera MD Last visit with PCP: 9/12/24 Next visit with PCP: 1/9/25     Patient Assistance Screening (VAF)  Patient verbally reports monthly or yearly income which is less than 400% federal poverty level  Application for program has been submitted for the following medications:   Eva Mei  Patient aware this process may take up to 2 weeks once income documents have been sent to the team.  If approved, medication must be filled through [facility] pharmacy and may be picked up or mailed to patient.   If approved, medication will be billed through insurance, and patient assistance team will pay the copay. This will result in a $0 copay for the patient.  Counseled patient on mechanism of action, side effects, contraindications, and what to do if the patient misses a dose. All patients questions were answered.        Subjective   Treatment Adherence:   Patient did take medications today.   Number of missed doses in last 7 days: very seldom twice/month.      Preferred pharmacy: Mail order  Can patient afford prescribed medications: Yes, enrolled in PAP for Jardiance, Tresiba    HPI    DIABETES MELLITUS TYPE 2:    Does patient follow with Endocrinology: No  Last optometry exam: couple months ago  Most recent visit in Podiatry: no-- patient denies sores or cuts on feet today. Bottom of feet hurt once in a while. Bought insoles. Not regular.      Current diabetic medications include:  Jardiance 25mg daily (restarted 9/27/24)  Insulin NPH 25 units BID (1.5 vials to go).   Metformin 500mg BID    Clarifications to above regimen: Patient titrated insulin from 20 units to 25  Adverse Effects: none noted    Past diabetic medications include:  None noted    Glucose Readings:  Glucometer/CGM Type: Mango 2    Current home BG readings: Readings 130-150 (first thing in  morning. Sometimes 200s on weekends. Sometimes higher due to late night snacking.    Has noticed a trend of lows on Wednesdays while golfing- Keeps apple and bar with him.   Breakfast: Cottage cheese with raisin bran. Yogurt. Encouraged protein consumption.   Sweats quite a bit. Drinks coffee. 1-2 glasses water/day. Couple cups coffee. Pure aqua zero sugar drink. Encouraged 6-8 glasses of water/day    Any episodes of hypoglycemia? No, lowest ever was 74 .  Did patient treat episode of hypoglycemia appropriately? N/A   Has had episode where all of a sudden low when outside working. Nighttime if anything. Hasn't eaten yet. Feels like jelly.     Advised to use Glucose tablets. Protein meal within an hour in event of hypoglycemia  Does pt have proteinuria? unknown. Needs lab    Lifestyle:  Diet: not discussed today  Physical Activity: not discussed today    Secondary Prevention:  Statin? Yes- rosuvastatin 20mg  ACE-I/ARB? Yes- losartan-hydrochlorothiazide 100-25mg daily  Nees new Rx  Aspirin? Yes    Review of Systems      Objective     There were no vitals taken for this visit.   BP Readings from Last 4 Encounters:   09/12/24 140/80   07/15/24 (!) 141/94   05/16/24 126/82   04/08/24 158/90      There were no vitals filed for this visit.     Labs  Lab Results   Component Value Date    BILITOT 0.6 09/10/2024    CALCIUM 9.6 09/10/2024    CO2 28 09/10/2024     09/10/2024    CREATININE 1.05 09/10/2024    GLUCOSE 97 09/10/2024    ALKPHOS 92 09/10/2024    K 4.1 09/10/2024    PROT 7.1 09/10/2024     09/10/2024    AST 18 09/10/2024    ALT 15 09/10/2024    BUN 20 09/10/2024    ANIONGAP 15 09/10/2024    ALBUMIN 4.2 09/10/2024    GFRMALE 66 09/25/2023     Lab Results   Component Value Date    TRIG 141 09/10/2024    CHOL 141 09/10/2024    LDLCALC 64 09/10/2024    HDL 49.2 09/10/2024     Lab Results   Component Value Date    HGBA1C 7.4 (H) 09/10/2024       Current Outpatient Medications   Medication Instructions     "amLODIPine (NORVASC) 10 mg, oral, Daily    aspirin 81 mg, oral, Daily    DULoxetine (CYMBALTA) 60 mg, oral, Daily    fish oil concentrate (Omega-3) 120-180 mg capsule     FreeStyle Mango 2 Alpha misc Use as instructed for continuous glucose monitoring    FreeStyle Mango 2 Sensor kit Place sensor every 14 days for continuous glucose monitoring    insulin syringe-needle U-100 31G X 5/16\" 0.5 mL syringe Use to inject 1-4 times daily as directed.    Jardiance 25 mg, oral, Daily    latanoprost (Xalatan) 0.005 % ophthalmic solution 1 drop, Both Eyes, Nightly    losartan-hydrochlorothiazide (Hyzaar) 100-25 mg tablet 1 tablet, oral, Daily    metFORMIN (GLUCOPHAGE) 500 mg, oral, 2 times daily (morning and late afternoon)    metoprolol tartrate (LOPRESSOR) 50 mg, oral, 2 times daily    MULTIVITAMIN ORAL oral, Daily RT    pen needle, diabetic 32 gauge x 5/32\" needle Use to inject insulin once daily as directed    rosuvastatin (CRESTOR) 20 mg, oral, Daily    timoloL maleate 0.5 % drops, once daily 1 drop, ophthalmic (eye), 2 times daily    Tresiba FlexTouch U-100 40 Units, subcutaneous, Daily, Take as directed per insulin instructions.         Drug Interactions;  None at time of review    Assessment/Plan   Problem List Items Addressed This Visit             ICD-10-CM    Type 2 diabetes mellitus E11.9     Patient's goal A1c is < 7%.  Is pt at goal? No, 7.4%  Patient's SMBGs are variable but generally in target range.     Rationale for plan: Patient restarted Jardiance 25mg ~1 week ago. Received Tresiba, but opted to finish his supply of NPH before transitioning. Has ~15 days left of NPH then will start Tresiba 40 units daily to reduce number of daily injections. Patient is also interested in CGM. Insurance prefers Mango 2.    Medication Changes:  STOP  Insulin NPH 25 units BID  START  Tresiba 40 units once daily  (Will transition in 2 weeks)  Jardiance 25 mg once daily  Submitted for PAP    Future Considerations:  Titrate " insulin  GLP-1?    Monitoring and Education:  UACR order placed  Will follow closely and titrate new insulin dose  Hypoglycemia education provided today  Freestyle Mango 2 prescription sent. Will guide patient through set-up in 2 weeks.           Relevant Medications    FreeStyle Mango 2 Sensor kit    FreeStyle Mango 2 [place] misc    Other Relevant Orders    Follow Up In Clinical Pharmacy       Follow-up: 10/17/24 1:00pm.     Time spent with pt: Total length of time 20 (minutes) of the encounter and more than 50% was spent counseling the patient.    Brittanie Dunn, PharmD    Continue all meds under the continuation of care with the referring provider and clinical pharmacy team.

## 2024-10-17 ENCOUNTER — APPOINTMENT (OUTPATIENT)
Dept: PHARMACY | Facility: HOSPITAL | Age: 79
End: 2024-10-17
Payer: MEDICARE

## 2024-10-24 ENCOUNTER — APPOINTMENT (OUTPATIENT)
Dept: PHARMACY | Facility: HOSPITAL | Age: 79
End: 2024-10-24
Payer: MEDICARE

## 2024-10-24 DIAGNOSIS — E11.9 TYPE 2 DIABETES MELLITUS WITHOUT COMPLICATION, WITHOUT LONG-TERM CURRENT USE OF INSULIN (MULTI): ICD-10-CM

## 2024-10-24 NOTE — PROGRESS NOTES
Patient Assistance for JardiDaniel gaitansiba approved through 9/20/25. Will have to be renewed prior to that date to prevent lapse in coverage. Medication(s) will be received at no cost to patient from On license of [facility].       Patient ID: Saturnino Escobedo is a 79 y.o. male who presents for Diabetes.    Referring Provider: Bud Escalera MD  PCP: Bud Escalera MD Last visit with PCP: 9/12/24 Next visit with PCP: 1/9/25      Subjective   Treatment Adherence:   Patient did take medications today.   Number of missed doses in last 7 days: very seldom twice/month.      Preferred pharmacy: Mail order  Can patient afford prescribed medications: Yes, enrolled in PAP for Jardiance, Tresiba    Patient asking questions about sleep hygiene today. States he stays up til ~1am on computer and then sleeps until 11am. Has started taking Nyquil to help him sleep at night. Advised proper sleep hygiene including avoiding screen time for at least an hour before desired bedtime, ensuring sleep space is quiet, dark and distraction-free. If still having trouble sleeping, advised melatonin 5mg daily.     HPI    DIABETES MELLITUS TYPE 2:    Does patient follow with Endocrinology: No  Last optometry exam: couple months ago  Most recent visit in Podiatry: no-- patient denies sores or cuts on feet today. Bottom of feet hurt once in a while. Bought insoles. Not regular.      Current diabetic medications include:  Jardiance 25mg daily (restarted 9/27/24)  Tresiba 40 units daily (started 10/24/24)   Metformin 500mg BID    Adverse Effects: none noted    Past diabetic medications include:  NPH    Glucose Readings:  Glucometer/CGM Type: Mango 2    Current home BG readings: Readings 130-150 (first thing in morning. Sometimes 200s on weekends. Sometimes higher due to late night snacking.    Has noticed a trend of lows on Wednesdays while golfing- Keeps apple and bar with him.   Breakfast: Cottage cheese with raisin bran. Yogurt. Encouraged protein  "consumption.   Sweats quite a bit. Drinks coffee. 1-2 glasses water/day. Couple cups coffee. Pure aqua zero sugar drink. Encouraged 6-8 glasses of water/day    Any episodes of hypoglycemia? No, lowest ever was 74 .  Did patient treat episode of hypoglycemia appropriately? N/A   Has had episode where all of a sudden low when outside working. Nighttime if anything. Hasn't eaten yet. Feels like jelly.     Advised to use Glucose tablets. Protein meal within an hour in event of hypoglycemia  Does pt have proteinuria? unknown. Needs lab    Lifestyle:  Diet: not discussed today  Physical Activity: not discussed today    Secondary Prevention:  Statin? Yes- rosuvastatin 20mg  ACE-I/ARB? Yes- losartan-hydrochlorothiazide 100-25mg daily  Nees new Rx  Aspirin? Yes    Review of Systems      Objective     There were no vitals taken for this visit.   BP Readings from Last 4 Encounters:   09/12/24 140/80   07/15/24 (!) 141/94   05/16/24 126/82   04/08/24 158/90      There were no vitals filed for this visit.     Labs  Lab Results   Component Value Date    BILITOT 0.6 09/10/2024    CALCIUM 9.6 09/10/2024    CO2 28 09/10/2024     09/10/2024    CREATININE 1.05 09/10/2024    GLUCOSE 97 09/10/2024    ALKPHOS 92 09/10/2024    K 4.1 09/10/2024    PROT 7.1 09/10/2024     09/10/2024    AST 18 09/10/2024    ALT 15 09/10/2024    BUN 20 09/10/2024    ANIONGAP 15 09/10/2024    ALBUMIN 4.2 09/10/2024    GFRMALE 66 09/25/2023     Lab Results   Component Value Date    TRIG 141 09/10/2024    CHOL 141 09/10/2024    LDLCALC 64 09/10/2024    HDL 49.2 09/10/2024     Lab Results   Component Value Date    HGBA1C 7.4 (H) 09/10/2024       Current Outpatient Medications   Medication Instructions    amLODIPine (NORVASC) 10 mg, oral, Daily    aspirin 81 mg, oral, Daily    DULoxetine (CYMBALTA) 60 mg, oral, Daily    fish oil concentrate (Omega-3) 120-180 mg capsule     insulin syringe-needle U-100 31G X 5/16\" 0.5 mL syringe Use to inject 1-4 times " "daily as directed.    Jardiance 25 mg, oral, Daily    latanoprost (Xalatan) 0.005 % ophthalmic solution 1 drop, Both Eyes, Nightly    losartan-hydrochlorothiazide (Hyzaar) 100-25 mg tablet 1 tablet, oral, Daily    metFORMIN (GLUCOPHAGE) 500 mg, oral, 2 times daily (morning and late afternoon)    metoprolol tartrate (LOPRESSOR) 50 mg, oral, 2 times daily    MULTIVITAMIN ORAL oral, Daily RT    pen needle, diabetic 32 gauge x 5/32\" needle Use to inject insulin once daily as directed    rosuvastatin (CRESTOR) 20 mg, oral, Daily    timoloL maleate 0.5 % drops, once daily 1 drop, ophthalmic (eye), 2 times daily    Tresiba FlexTouch U-100 40 Units, subcutaneous, Daily, Take as directed per insulin instructions.         Drug Interactions;  None at time of review    Assessment/Plan   Problem List Items Addressed This Visit             ICD-10-CM    Type 2 diabetes mellitus E11.9     Patient's goal A1c is < 7%.  Is pt at goal? No, 7.4%  Patient's SMBGs are variable but generally in target range.     Rationale for plan: Patient restarted Jardiance 25mg ~1 month ago. Switched from NPH to Tresiba today, 10/24/24 (20% TDD reduction). Received [employer] system from [employer]. Sent materials to email for patient to set up today.    Medication Changes:  STOP  Insulin NPH 25 units BID  START  Tresiba 40 units once daily  Jardiance 25 mg once daily    Future Considerations:  Titrate insulin  GLP-1?    Monitoring and Education:  UACR order placed  Will follow closely and titrate new insulin dose  Hypoglycemia education provided today  Mango 3+ set up           Relevant Orders    Referral to Clinical Pharmacy         Follow-up: 11/7/24 1:00pm.     Time spent with pt: Total length of time 20 (minutes) of the encounter and more than 50% was spent counseling the patient.    Brittanie Dunn, PharmD    Continue all meds under the continuation of care with the referring provider and clinical pharmacy team.    "

## 2024-10-24 NOTE — ASSESSMENT & PLAN NOTE
Patient's goal A1c is < 7%.  Is pt at goal? No, 7.4%  Patient's SMBGs are variable but generally in target range.     Rationale for plan: Patient restarted Jardiance 25mg ~1 month ago. Switched from NPH to Tresiba today, 10/24/24 (20% TDD reduction). Received [employer] 3+ system from [employer]. Sent materials to email for patient to set up today.    Medication Changes:  STOP  Insulin NPH 25 units BID  START  Tresiba 40 units once daily  Jardiance 25 mg once daily    Future Considerations:  Titrate insulin  GLP-1?    Monitoring and Education:  UACR order placed  Will follow closely and titrate new insulin dose  Hypoglycemia education provided today  Mango 3+ set up

## 2024-10-30 DIAGNOSIS — I10 PRIMARY HYPERTENSION: ICD-10-CM

## 2024-10-30 RX ORDER — LOSARTAN POTASSIUM AND HYDROCHLOROTHIAZIDE 25; 100 MG/1; MG/1
1 TABLET ORAL DAILY
Qty: 90 TABLET | Refills: 3 | Status: SHIPPED | OUTPATIENT
Start: 2024-10-30 | End: 2025-10-30

## 2024-11-07 ENCOUNTER — APPOINTMENT (OUTPATIENT)
Dept: PHARMACY | Facility: HOSPITAL | Age: 79
End: 2024-11-07
Payer: MEDICARE

## 2024-11-07 DIAGNOSIS — E11.9 TYPE 2 DIABETES MELLITUS WITHOUT COMPLICATION, WITHOUT LONG-TERM CURRENT USE OF INSULIN (MULTI): ICD-10-CM

## 2024-11-07 NOTE — ASSESSMENT & PLAN NOTE
Patient's goal A1c is < 7%.  Is pt at goal? No, 7.4%  Patient's SMBGs are at goal (75% TIR per CGM).     Rationale for plan: BG well controlled on metformin 500mg BID, Jardiance 25mg daily and Tresiba 40 units once daily. Appreciates only injecting once daily. Received Mango 3+ system from [employer]. Using CGM appropriately.     Medication Changes:  CONTINUE  Tresiba 40 units once daily  Jardiance 25 mg once daily    Future Considerations:  GLP-1?    Monitoring and Education:  UACR order placed  Will follow closely and titrate new insulin dose  Mnago 3+ set up

## 2024-11-07 NOTE — PROGRESS NOTES
Patient Assistance for Jardiance, Tresiba approved through 9/20/25. Will have to be renewed prior to that date to prevent lapse in coverage. Medication(s) will be received at no cost to patient from [facility].       Patient ID: Saturnino Escobedo is a 79 y.o. male who presents for Diabetes.    Referring Provider: Bud Escalera MD  PCP: Bud Escalera MD Last visit with PCP: 9/12/24 Next visit with PCP: 1/9/25      Subjective   Treatment Adherence:   Patient did take medications today.   Number of missed doses in last 7 days: very seldom twice/month.      Preferred pharmacy: Mail order  Can patient afford prescribed medications: Yes, enrolled in PAP for Jardiance, Tresiba      HPI    DIABETES MELLITUS TYPE 2:    Does patient follow with Endocrinology: No  Last optometry exam: couple months ago  Most recent visit in Podiatry: no-- patient denies sores or cuts on feet today. Bottom of feet hurt once in a while. Bought insoles. Not regular.      Current diabetic medications include:  Jardiance 25mg daily (restarted 9/27/24)  Tresiba 40 units daily (started 10/24/24)   Metformin 500mg BID    Adverse Effects: none noted    Past diabetic medications include:  NPH    Glucose Readings:  Glucometer/CGM Type: Mango 2    Current home BG readings: Using Mango 3  Patient read off 14 day report from Mango:  Average 148.   12am-6am: 145  6am-12pm: 125  12pm-6pm: 128  6pm-12am: 169     Time in target (14d):   75% TIR   Above range 25%    No lows    Has noticed a trend of lows on Wednesdays while golfing- Keeps apple and bar with him.   Breakfast: Cottage cheese with raisin bran. Yogurt. Encouraged protein consumption.   Sweats quite a bit. Drinks coffee. 1-2 glasses water/day. Couple cups coffee. Pure aqua zero sugar drink. Encouraged 6-8 glasses of water/day    Any episodes of hypoglycemia? No, lowest ever was 74 .  Did patient treat episode of hypoglycemia appropriately? N/A   Has had episode where all of a sudden  "low when outside working. Nighttime if anything. Hasn't eaten yet. Feels like jelly.     Advised to use Glucose tablets. Protein meal within an hour in event of hypoglycemia  Does pt have proteinuria? unknown. Needs lab    Lifestyle:  Diet: not discussed today  Physical Activity: not discussed today    Secondary Prevention:  Statin? Yes- rosuvastatin 20mg  ACE-I/ARB? Yes- losartan-hydrochlorothiazide 100-25mg daily  Nees new Rx  Aspirin? Yes    Review of Systems      Objective     There were no vitals taken for this visit.   BP Readings from Last 4 Encounters:   09/12/24 140/80   07/15/24 (!) 141/94   05/16/24 126/82   04/08/24 158/90      There were no vitals filed for this visit.     Labs  Lab Results   Component Value Date    BILITOT 0.6 09/10/2024    CALCIUM 9.6 09/10/2024    CO2 28 09/10/2024     09/10/2024    CREATININE 1.05 09/10/2024    GLUCOSE 97 09/10/2024    ALKPHOS 92 09/10/2024    K 4.1 09/10/2024    PROT 7.1 09/10/2024     09/10/2024    AST 18 09/10/2024    ALT 15 09/10/2024    BUN 20 09/10/2024    ANIONGAP 15 09/10/2024    ALBUMIN 4.2 09/10/2024    GFRMALE 66 09/25/2023     Lab Results   Component Value Date    TRIG 141 09/10/2024    CHOL 141 09/10/2024    LDLCALC 64 09/10/2024    HDL 49.2 09/10/2024     Lab Results   Component Value Date    HGBA1C 7.4 (H) 09/10/2024       Current Outpatient Medications   Medication Instructions    amLODIPine (NORVASC) 10 mg, oral, Daily    aspirin 81 mg, oral, Daily    DULoxetine (CYMBALTA) 60 mg, oral, Daily    fish oil concentrate (Omega-3) 120-180 mg capsule     insulin syringe-needle U-100 31G X 5/16\" 0.5 mL syringe Use to inject 1-4 times daily as directed.    Jardiance 25 mg, oral, Daily    latanoprost (Xalatan) 0.005 % ophthalmic solution 1 drop, Both Eyes, Nightly    losartan-hydrochlorothiazide (Hyzaar) 100-25 mg tablet 1 tablet, oral, Daily    metFORMIN (GLUCOPHAGE) 500 mg, oral, 2 times daily (morning and late afternoon)    metoprolol tartrate " "(LOPRESSOR) 50 mg, oral, 2 times daily    MULTIVITAMIN ORAL oral, Daily RT    pen needle, diabetic 32 gauge x 5/32\" needle Use to inject insulin once daily as directed    rosuvastatin (CRESTOR) 20 mg, oral, Daily    timoloL maleate 0.5 % drops, once daily 1 drop, ophthalmic (eye), 2 times daily    Tresiba FlexTouch U-100 40 Units, subcutaneous, Daily, Take as directed per insulin instructions.         Drug Interactions;  None at time of review    Assessment/Plan   Problem List Items Addressed This Visit             ICD-10-CM    Type 2 diabetes mellitus E11.9     Patient's goal A1c is < 7%.  Is pt at goal? No, 7.4%  Patient's SMBGs are at goal (75% TIR per CGM).     Rationale for plan: BG well controlled on metformin 500mg BID, Jardiance 25mg daily and Tresiba 40 units once daily. Appreciates only injecting once daily. Received Mango 3+ system from [employer]. Using CGM appropriately.     Medication Changes:  CONTINUE  Tresiba 40 units once daily  Jardiance 25 mg once daily    Future Considerations:  GLP-1?    Monitoring and Education:  UACR order placed  Will follow closely and titrate new insulin dose  Mango 3+ set up              Follow-up:  3 months after PCP visit, updated labs   2/6/24 1:00pm.     Time spent with pt: Total length of time 20 (minutes) of the encounter and more than 50% was spent counseling the patient.    Brittanie Dunn, PharmD    Continue all meds under the continuation of care with the referring provider and clinical pharmacy team.    "

## 2024-12-12 ENCOUNTER — TELEPHONE (OUTPATIENT)
Dept: PHARMACY | Facility: HOSPITAL | Age: 79
End: 2024-12-12
Payer: MEDICARE

## 2024-12-12 DIAGNOSIS — E11.9 TYPE 2 DIABETES MELLITUS WITHOUT COMPLICATION, WITHOUT LONG-TERM CURRENT USE OF INSULIN (MULTI): ICD-10-CM

## 2024-12-12 PROCEDURE — RXMED WILLOW AMBULATORY MEDICATION CHARGE

## 2024-12-12 RX ORDER — INSULIN DEGLUDEC 100 U/ML
36 INJECTION, SOLUTION SUBCUTANEOUS DAILY
Qty: 32.4 ML | Refills: 3 | Status: SHIPPED | OUTPATIENT
Start: 2024-12-12 | End: 2025-12-12

## 2024-12-12 NOTE — TELEPHONE ENCOUNTER
Returned patient call 12/12/24 at 1:00pm. Educated patient about process for receiving more Mango 3+ sensors from [employer]. Due for new shipment in January.    While on the phone, patient noted that he is regularly woken up at night with low blood sugar alarm. Usually goes back to sleep and wakes up with sugar in the 80s. States average blood sugar per CGM ~148. Advised that patient reduce Tresiba dose to 36 units to avoid overnight lows. Patient agreeable to adjustment.    Brittanie Dunn, ValreiaD

## 2024-12-13 ENCOUNTER — PHARMACY VISIT (OUTPATIENT)
Dept: PHARMACY | Facility: CLINIC | Age: 79
End: 2024-12-13
Payer: COMMERCIAL

## 2024-12-13 PROCEDURE — RXMED WILLOW AMBULATORY MEDICATION CHARGE

## 2024-12-17 ENCOUNTER — PHARMACY VISIT (OUTPATIENT)
Dept: PHARMACY | Facility: CLINIC | Age: 79
End: 2024-12-17
Payer: COMMERCIAL

## 2025-01-07 ENCOUNTER — OFFICE VISIT (OUTPATIENT)
Dept: URGENT CARE | Age: 80
End: 2025-01-07
Payer: MEDICARE

## 2025-01-07 VITALS
OXYGEN SATURATION: 94 % | SYSTOLIC BLOOD PRESSURE: 131 MMHG | TEMPERATURE: 98.1 F | HEART RATE: 82 BPM | DIASTOLIC BLOOD PRESSURE: 87 MMHG

## 2025-01-07 DIAGNOSIS — Z20.822 COVID-19 VIRUS RNA NOT DETECTED: ICD-10-CM

## 2025-01-07 DIAGNOSIS — J15.7 PNEUMONIA OF RIGHT LOWER LOBE DUE TO MYCOPLASMA PNEUMONIAE: Primary | ICD-10-CM

## 2025-01-07 LAB
POC RAPID INFLUENZA A: NEGATIVE
POC RAPID INFLUENZA B: NEGATIVE
POC SARS-COV-2 AG BINAX: NORMAL

## 2025-01-07 PROCEDURE — 3079F DIAST BP 80-89 MM HG: CPT | Performed by: NURSE PRACTITIONER

## 2025-01-07 PROCEDURE — 1159F MED LIST DOCD IN RCRD: CPT | Performed by: NURSE PRACTITIONER

## 2025-01-07 PROCEDURE — 99214 OFFICE O/P EST MOD 30 MIN: CPT | Performed by: NURSE PRACTITIONER

## 2025-01-07 PROCEDURE — 1123F ACP DISCUSS/DSCN MKR DOCD: CPT | Performed by: NURSE PRACTITIONER

## 2025-01-07 PROCEDURE — 87811 SARS-COV-2 COVID19 W/OPTIC: CPT | Performed by: NURSE PRACTITIONER

## 2025-01-07 PROCEDURE — 87804 INFLUENZA ASSAY W/OPTIC: CPT | Performed by: NURSE PRACTITIONER

## 2025-01-07 PROCEDURE — 3075F SYST BP GE 130 - 139MM HG: CPT | Performed by: NURSE PRACTITIONER

## 2025-01-07 RX ORDER — AZITHROMYCIN 250 MG/1
TABLET, FILM COATED ORAL
Qty: 6 TABLET | Refills: 0 | Status: SHIPPED | OUTPATIENT
Start: 2025-01-07 | End: 2025-01-12

## 2025-01-07 RX ORDER — BENZONATATE 200 MG/1
200 CAPSULE ORAL 3 TIMES DAILY PRN
Qty: 42 CAPSULE | Refills: 0 | Status: SHIPPED | OUTPATIENT
Start: 2025-01-07 | End: 2025-02-06

## 2025-01-07 NOTE — PROGRESS NOTES
Subjective   Patient ID: Saturnino Escobedo is a 79 y.o. male. They present today with a chief complaint of Cough (Cough w/ phlem, sore throat, congestion, stuffy, fatigue x4-5days).    History of Present Illness  79-year-old male coming in with complaints of a cough, sore throat and congestion.  He states he is also felt very fatigued.  He denies any shortness of breath.  He denies any fevers or chills.  Denies any other complaints today.    Past Medical History  Allergies as of 01/07/2025    (No Known Allergies)       (Not in a hospital admission)       No past medical history on file.    Past Surgical History:   Procedure Laterality Date    CHOLECYSTECTOMY      OTHER SURGICAL HISTORY  06/20/2022    Knee replacement    OTHER SURGICAL HISTORY  06/20/2022    Prostate biopsy    OTHER SURGICAL HISTORY  06/20/2022    Hemorrhoidectomy    OTHER SURGICAL HISTORY  06/20/2022    Hernia repair        reports that he has never smoked. He has never used smokeless tobacco. He reports that he does not currently use alcohol. He reports that he does not use drugs.    Review of Systems  Review of Systems:  General: No weight loss, positive fatigue, no anorexia, insomnia, fever, chills.  ENT: Positive pharyngitis, no dry mouth, positive nasal congestion, no ear pain  Cardiac: No chest pain, palpitations, syncope, near syncope.  Pulmonary:  No shortness of breath, positive cough, no hemoptysis  Heme/lymph: No swollen glands, fever, bleeding  Musculoskeletal: No limb pain, joint pain, joint swelling.  Skin: No rashes  Neuro: No numbness, tingling, headaches                                 Objective    Vitals:    01/07/25 0828   BP: 131/87   BP Location: Left arm   Patient Position: Sitting   BP Cuff Size: Small adult   Pulse: 82   Temp: 36.7 °C (98.1 °F)   TempSrc: Oral   SpO2: 94%     No LMP for male patient.    Physical Exam  Physical Exam:  General: Vital noted, no distress. Afebrile  EENT: Eyes unremarkable, Pupils PERRLA, EOMs  intact. TMs unremarkable. Posterior oropharynx unremarkable. Uvula in the midline and non-edematous. No PTA. No retropharyngeal mass. No Andres's angina.  Cardiac: Regular rate and rhythm, no murmur  Pulmonary: Lungs with rhonchi in the right lower lobe, remainder of lung fields clear bilaterally with good aeration.   Skin: No rashes      Procedures    Point of Care Test & Imaging Results from this visit  Results for orders placed or performed in visit on 01/07/25   POCT Covid-19 Rapid Antigen   Result Value Ref Range    POC BARBARA-COV-2 AG  Presumptive negative test for SARS-CoV-2 (no antigen detected)     Presumptive negative test for SARS-CoV-2 (no antigen detected)   POCT Influenza A/B manually resulted   Result Value Ref Range    POC Rapid Influenza A Negative Negative    POC Rapid Influenza B Negative Negative      No results found.    Diagnostic study results (if any) were reviewed by TIP Patel.    Assessment/Plan   Allergies, medications, history, and pertinent labs/EKGs/Imaging reviewed by TIP Patel.     Medical Decision Making  Testing: rapid covid and flu  Treatment: zithromax and tessalon prescribed  Differential: 1) covid , 2) pneumonia, 3) flu  Plan: Patient will follow up with the PCP in the next 2-3 days. Return for any worsening symptoms or go to the ER for further evaluation. Patient understands return precautions and discharge insturctions.  Impression:   1) pneumonia      Orders and Diagnoses  Diagnoses and all orders for this visit:  Pneumonia of right lower lobe due to Mycoplasma pneumoniae  -     azithromycin (Zithromax) 250 mg tablet; Take 2 tabs (500 mg) by mouth today, than 1 daily for 4 days.  -     benzonatate (Tessalon) 200 mg capsule; Take 1 capsule (200 mg) by mouth 3 times a day as needed for cough. Do not crush or chew.  -     POCT Covid-19 Rapid Antigen  -     POCT Influenza A/B manually resulted  COVID-19 virus RNA not detected      Medical Admin  Record      Patient disposition: Home    Electronically signed by TIP Patel  8:46 AM

## 2025-01-09 ENCOUNTER — LAB (OUTPATIENT)
Dept: LAB | Facility: LAB | Age: 80
End: 2025-01-09
Payer: MEDICARE

## 2025-01-09 ENCOUNTER — APPOINTMENT (OUTPATIENT)
Dept: PRIMARY CARE | Facility: CLINIC | Age: 80
End: 2025-01-09
Payer: MEDICARE

## 2025-01-09 VITALS
DIASTOLIC BLOOD PRESSURE: 74 MMHG | OXYGEN SATURATION: 97 % | HEART RATE: 66 BPM | SYSTOLIC BLOOD PRESSURE: 122 MMHG | WEIGHT: 196 LBS | TEMPERATURE: 97.8 F | BODY MASS INDEX: 34.72 KG/M2

## 2025-01-09 DIAGNOSIS — E11.21 TYPE 2 DIABETES MELLITUS WITH DIABETIC NEPHROPATHY, UNSPECIFIED WHETHER LONG TERM INSULIN USE: ICD-10-CM

## 2025-01-09 DIAGNOSIS — E66.01 OBESITY, MORBID (MULTI): Primary | ICD-10-CM

## 2025-01-09 DIAGNOSIS — F33.42 RECURRENT MAJOR DEPRESSIVE DISORDER, IN FULL REMISSION (CMS-HCC): ICD-10-CM

## 2025-01-09 DIAGNOSIS — I10 PRIMARY HYPERTENSION: ICD-10-CM

## 2025-01-09 DIAGNOSIS — E78.2 MIXED HYPERLIPIDEMIA: ICD-10-CM

## 2025-01-09 LAB
ALBUMIN SERPL BCP-MCNC: 4.5 G/DL (ref 3.4–5)
ALP SERPL-CCNC: 102 U/L (ref 33–136)
ALT SERPL W P-5'-P-CCNC: 19 U/L (ref 10–52)
ANION GAP SERPL CALC-SCNC: 16 MMOL/L (ref 10–20)
AST SERPL W P-5'-P-CCNC: 20 U/L (ref 9–39)
BILIRUB SERPL-MCNC: 0.7 MG/DL (ref 0–1.2)
BUN SERPL-MCNC: 19 MG/DL (ref 6–23)
CALCIUM SERPL-MCNC: 10.1 MG/DL (ref 8.6–10.6)
CHLORIDE SERPL-SCNC: 101 MMOL/L (ref 98–107)
CHOLEST SERPL-MCNC: 140 MG/DL (ref 0–199)
CHOLESTEROL/HDL RATIO: 3.7
CO2 SERPL-SCNC: 29 MMOL/L (ref 21–32)
CREAT SERPL-MCNC: 1.08 MG/DL (ref 0.5–1.3)
EGFRCR SERPLBLD CKD-EPI 2021: 70 ML/MIN/1.73M*2
EST. AVERAGE GLUCOSE BLD GHB EST-MCNC: 146 MG/DL
GLUCOSE SERPL-MCNC: 102 MG/DL (ref 74–99)
HBA1C MFR BLD: 6.7 %
HDLC SERPL-MCNC: 37.7 MG/DL
LDLC SERPL CALC-MCNC: 59 MG/DL
NON HDL CHOLESTEROL: 102 MG/DL (ref 0–149)
POTASSIUM SERPL-SCNC: 4.2 MMOL/L (ref 3.5–5.3)
PROT SERPL-MCNC: 7.8 G/DL (ref 6.4–8.2)
SODIUM SERPL-SCNC: 142 MMOL/L (ref 136–145)
TRIGL SERPL-MCNC: 215 MG/DL (ref 0–149)
VLDL: 43 MG/DL (ref 0–40)

## 2025-01-09 PROCEDURE — 80061 LIPID PANEL: CPT

## 2025-01-09 PROCEDURE — 1159F MED LIST DOCD IN RCRD: CPT | Performed by: FAMILY MEDICINE

## 2025-01-09 PROCEDURE — 80053 COMPREHEN METABOLIC PANEL: CPT

## 2025-01-09 PROCEDURE — 83036 HEMOGLOBIN GLYCOSYLATED A1C: CPT

## 2025-01-09 PROCEDURE — 1036F TOBACCO NON-USER: CPT | Performed by: FAMILY MEDICINE

## 2025-01-09 PROCEDURE — 1123F ACP DISCUSS/DSCN MKR DOCD: CPT | Performed by: FAMILY MEDICINE

## 2025-01-09 PROCEDURE — G2211 COMPLEX E/M VISIT ADD ON: HCPCS | Performed by: FAMILY MEDICINE

## 2025-01-09 PROCEDURE — 3078F DIAST BP <80 MM HG: CPT | Performed by: FAMILY MEDICINE

## 2025-01-09 PROCEDURE — 3074F SYST BP LT 130 MM HG: CPT | Performed by: FAMILY MEDICINE

## 2025-01-09 PROCEDURE — 99214 OFFICE O/P EST MOD 30 MIN: CPT | Performed by: FAMILY MEDICINE

## 2025-01-09 ASSESSMENT — ENCOUNTER SYMPTOMS: HYPERTENSION: 1

## 2025-01-09 NOTE — PROGRESS NOTES
"Subjective   Patient ID: Saturnino Escobedo is a 79 y.o. male who presents for Hypertension and Hyperlipidemia (Recheck).  Hypertension    Hyperlipidemia      1. lipids: well controlled     2. anxiety: doing well overall.       3. HTN: weight is good, stable     4. DM2:   A1c is stable.     5. OA: stable    6. Memory loss: has been forgetting things a lot.  18/22    Current Outpatient Medications on File Prior to Visit   Medication Sig Dispense Refill    amLODIPine (Norvasc) 10 mg tablet TAKE 1 TABLET EVERY DAY 90 tablet 3    aspirin 81 mg EC tablet Take 1 tablet (81 mg) by mouth once daily.      azithromycin (Zithromax) 250 mg tablet Take 2 tabs (500 mg) by mouth today, than 1 daily for 4 days. 6 tablet 0    benzonatate (Tessalon) 200 mg capsule Take 1 capsule (200 mg) by mouth 3 times a day as needed for cough. Do not crush or chew. 42 capsule 0    DULoxetine (Cymbalta) 60 mg DR capsule Take 1 capsule (60 mg) by mouth once daily. 90 capsule 3    empagliflozin (Jardiance) 25 mg Take 1 tablet (25 mg) by mouth once daily. 90 tablet 3    fish oil concentrate (Omega-3) 120-180 mg capsule       insulin degludec (Tresiba FlexTouch U-100) 100 unit/mL (3 mL) injection Inject 36 Units under the skin once daily. Take as directed per insulin instructions. 32.4 mL 3    insulin syringe-needle U-100 31G X 5/16\" 0.5 mL syringe Use to inject 1-4 times daily as directed. 200 each 3    latanoprost (Xalatan) 0.005 % ophthalmic solution Administer 1 drop into both eyes once daily at bedtime.      losartan-hydrochlorothiazide (Hyzaar) 100-25 mg tablet Take 1 tablet by mouth once daily. 90 tablet 3    metFORMIN (Glucophage) 500 mg tablet Take 1 tablet (500 mg) by mouth 2 times daily (morning and late afternoon). 180 tablet 3    metoprolol tartrate (Lopressor) 50 mg tablet Take 1 tablet by mouth 2 times a day. 180 tablet 3    MULTIVITAMIN ORAL Take by mouth once daily.      pen needle, diabetic 32 gauge x 5/32\" needle Use to inject insulin " once daily as directed 100 each 11    rosuvastatin (Crestor) 20 mg tablet Take 1 tablet (20 mg) by mouth once daily. 90 tablet 3    timoloL maleate 0.5 % drops, once daily Administer 1 drop into affected eye(s) 2 times a day.       No current facility-administered medications on file prior to visit.        Vitals:    01/09/25 1024   BP: 122/74   Pulse: 66   Temp: 36.6 °C (97.8 °F)   SpO2: 97%       Review of Systems   All other systems reviewed and are negative.      Objective     Physical Exam  Vitals reviewed.   Constitutional:       General: He is not in acute distress.     Appearance: Normal appearance. He is well-developed. He is not diaphoretic.   HENT:      Head: Normocephalic and atraumatic.      Right Ear: Tympanic membrane normal.      Left Ear: Tympanic membrane normal.      Nose: Nose normal.      Mouth/Throat:      Mouth: Mucous membranes are moist.   Eyes:      Pupils: Pupils are equal, round, and reactive to light.   Cardiovascular:      Rate and Rhythm: Normal rate and regular rhythm.      Heart sounds: Normal heart sounds. No murmur heard.     No friction rub. No gallop.   Pulmonary:      Effort: Pulmonary effort is normal.      Breath sounds: Normal breath sounds. No rales.   Abdominal:      General: Bowel sounds are normal.      Palpations: Abdomen is soft.      Tenderness: There is no abdominal tenderness.   Musculoskeletal:      Cervical back: Normal range of motion and neck supple.   Skin:     General: Skin is warm and dry.   Neurological:      Mental Status: He is alert.   Psychiatric:         Mood and Affect: Mood normal.         Office Visit on 01/07/2025   Component Date Value Ref Range Status    POC BARBARA-COV-2 AG 01/07/2025 Presumptive negative test for SARS-CoV-2 (no antigen detected)  Presumptive negative test for SARS-CoV-2 (no antigen detected) Final    POC Rapid Influenza A 01/07/2025 Negative  Negative Final    POC Rapid Influenza B 01/07/2025 Negative  Negative Final        Assessment/Plan   Problem List Items Addressed This Visit       Hyperlipidemia    HTN (hypertension)    Depression    Obesity, morbid (Multi) - Primary    Type 2 diabetes mellitus with diabetic nephropathy, unspecified whether long term insulin use    Relevant Orders    Comprehensive metabolic panel    Hemoglobin A1c    Lipid panel    Comprehensive metabolic panel    Lipid panel    Hemoglobin A1c     Medications refilled.  Checking BW today.  Follow up in 4 months  Referring to pharmacy to try to help with medication cost.  Walk!!

## 2025-02-06 ENCOUNTER — APPOINTMENT (OUTPATIENT)
Dept: PHARMACY | Facility: HOSPITAL | Age: 80
End: 2025-02-06
Payer: MEDICARE

## 2025-02-06 DIAGNOSIS — E11.9 TYPE 2 DIABETES MELLITUS WITHOUT COMPLICATION, WITHOUT LONG-TERM CURRENT USE OF INSULIN (MULTI): Primary | ICD-10-CM

## 2025-02-06 DIAGNOSIS — E11.21 TYPE 2 DIABETES MELLITUS WITH DIABETIC NEPHROPATHY, UNSPECIFIED WHETHER LONG TERM INSULIN USE: ICD-10-CM

## 2025-02-06 PROCEDURE — RXMED WILLOW AMBULATORY MEDICATION CHARGE

## 2025-02-06 NOTE — PROGRESS NOTES
Patient Assistance for Jardiance, Tresiba approved through 9/20/25. Will have to be renewed prior to that date to prevent lapse in coverage. Medication(s) will be received at no cost to patient from [facility].       Patient ID: Saturnino Escobedo is a 79 y.o. male who presents for Diabetes.    Referring Provider: Bud Escalera MD  PCP: Bud Escalera MD Last visit with PCP: 1/9/25 Next visit with PCP: 5/8/25      Subjective   Treatment Adherence:   Patient did take medications today.   Number of missed doses in last 7 days: very seldom twice/month.      Preferred pharmacy: Mail order  Can patient afford prescribed medications: Yes, enrolled in PAP for JardiDaniel gaitansiba        HPI    DIABETES MELLITUS TYPE 2:    Does patient follow with Endocrinology: No  Last optometry exam: couple months ago  Most recent visit in Podiatry: no-- patient denies sores or cuts on feet today. Bottom of feet hurt once in a while. Bought insoles. Not regular.      Current diabetic medications include:  Jardiance 25mg daily   Tresiba 36 units daily   Metformin 500mg BID    Adverse Effects: none noted    Past diabetic medications include:  NPH    Glucose Readings:  Glucometer/CGM Type: Glucometer.   Previously used Mango 3, but found cost too expensive this year. States last CGM report showed 87% TIR with no lows.  Checked FBG once last week -134.     Has noticed a trend of lows on Wednesdays while golfing- Keeps apple and bar with him.   Breakfast: Cottage cheese with raisin bran. Yogurt. Encouraged protein consumption.   Sweats quite a bit. Drinks coffee. 1-2 glasses water/day. Couple cups coffee. Pure aqua zero sugar drink. Encouraged 6-8 glasses of water/day  Eating- sleeps late. Does not eat until 12-1pm. Coffee, cottage chese, yogurt. Supper around 7pm. Chicken. Sometimes messes up at night. Cookies, apples and peanut butter.    Any episodes of hypoglycemia? No, lowest ever was 74 .  Did patient treat episode of  "hypoglycemia appropriately? N/A   Has had episode where all of a sudden low when outside working. Nighttime if anything. Hasn't eaten yet. Feels like jelly.     Advised to use Glucose tablets. Protein meal within an hour in event of hypoglycemia  Does pt have proteinuria? unknown. Needs lab    Lifestyle:  Diet: not discussed today  Physical Activity: not discussed today    Secondary Prevention:  Statin? Yes- rosuvastatin 20mg  ACE-I/ARB? Yes- losartan-hydrochlorothiazide 100-25mg daily  Nees new Rx  Aspirin? Yes    Review of Systems    No history Pancreatitis  No MTC    Objective     There were no vitals taken for this visit.   BP Readings from Last 4 Encounters:   01/09/25 122/74   01/07/25 131/87   09/12/24 140/80   07/15/24 (!) 141/94      There were no vitals filed for this visit.     Labs  Lab Results   Component Value Date    BILITOT 0.7 01/09/2025    CALCIUM 10.1 01/09/2025    CO2 29 01/09/2025     01/09/2025    CREATININE 1.08 01/09/2025    GLUCOSE 102 (H) 01/09/2025    ALKPHOS 102 01/09/2025    K 4.2 01/09/2025    PROT 7.8 01/09/2025     01/09/2025    AST 20 01/09/2025    ALT 19 01/09/2025    BUN 19 01/09/2025    ANIONGAP 16 01/09/2025    ALBUMIN 4.5 01/09/2025    GFRMALE 66 09/25/2023     Lab Results   Component Value Date    TRIG 215 (H) 01/09/2025    CHOL 140 01/09/2025    LDLCALC 59 01/09/2025    HDL 37.7 01/09/2025     Lab Results   Component Value Date    HGBA1C 6.7 (H) 01/09/2025       Current Outpatient Medications   Medication Instructions    amLODIPine (NORVASC) 10 mg, oral, Daily    aspirin 81 mg, Daily    DULoxetine (CYMBALTA) 60 mg, oral, Daily    fish oil concentrate (Omega-3) 120-180 mg capsule     insulin syringe-needle U-100 31G X 5/16\" 0.5 mL syringe Use to inject 1-4 times daily as directed.    Jardiance 25 mg, oral, Daily    latanoprost (Xalatan) 0.005 % ophthalmic solution 1 drop, Nightly    losartan-hydrochlorothiazide (Hyzaar) 100-25 mg tablet 1 tablet, oral, Daily    " "metFORMIN (GLUCOPHAGE) 500 mg, oral, 2 times daily (morning and late afternoon)    metoprolol tartrate (LOPRESSOR) 50 mg, oral, 2 times daily    MULTIVITAMIN ORAL Daily RT    pen needle, diabetic 32 gauge x 5/32\" needle Use to inject insulin once daily as directed    rosuvastatin (CRESTOR) 20 mg, oral, Daily    semaglutide 0.25 mg or 0.5 mg (2 mg/3 mL) pen injector Inject 0.25 mg under the skin 1 (one) time per week for 28 days, THEN 0.5 mg 1 (one) time per week for 28 days.    timoloL maleate 0.5 % drops, once daily 1 drop, 2 times daily    Tresiba FlexTouch U-100 36 Units, subcutaneous, Daily, Take as directed per insulin instructions.         Drug Interactions;  None at time of review    Assessment/Plan   Problem List Items Addressed This Visit             ICD-10-CM    Type 2 diabetes mellitus - Primary E11.9     Patient's goal A1c is < 7%.  Is pt at goal? Yes, 6.7%  Patient's SMBGs are at goal (81% TIR per CGM).     Rationale for plan: BG well controlled on metformin 500mg BID, Jardiance 25mg daily and Tresiba 36 units once daily. Patient recently decided Mango 3 was too expensive with beginning of the year copay. Discussed patient goal of weight loss and offered to start GLP1 therapy to promote weight loss. Patient interested in starting Ozempic. Will reduce Tresiba when starting Ozempic.    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     Time spent with pt: Total length of time 20 " (minutes) of the encounter and more than 50% was spent counseling the patient.    Brittanie Dunn, PharmD    Continue all meds under the continuation of care with the referring provider and clinical pharmacy team.

## 2025-02-07 NOTE — ASSESSMENT & PLAN NOTE
Patient's goal A1c is < 7%.  Is pt at goal? Yes, 6.7%  Patient's SMBGs are at goal (81% TIR per CGM).     Rationale for plan: BG well controlled on metformin 500mg BID, Jardiance 25mg daily and Tresiba 36 units once daily. Patient recently decided Mango 3 was too expensive with beginning of the year copay. Discussed patient goal of weight loss and offered to start GLP1 therapy to promote weight loss. Patient interested in starting Ozempic. Will reduce Tresiba when starting Ozempic.    Medication Changes:  START  Ozempic 0.25mg once weekly  DECREASE  Tresiba 30 units when starting Ozempic  CONTINUE  Jardiance 25 mg once daily    Monitoring and Education:  UACR order placed  Titrate insulin, Ozempic  Educated on administration, MOA, side effects of Ozempic

## 2025-02-11 ENCOUNTER — PHARMACY VISIT (OUTPATIENT)
Dept: PHARMACY | Facility: CLINIC | Age: 80
End: 2025-02-11
Payer: COMMERCIAL

## 2025-03-04 DIAGNOSIS — E11.9 TYPE 2 DIABETES MELLITUS WITHOUT COMPLICATION, WITHOUT LONG-TERM CURRENT USE OF INSULIN (MULTI): Primary | ICD-10-CM

## 2025-03-04 RX ORDER — INSULIN GLARGINE 100 [IU]/ML
30 INJECTION, SOLUTION SUBCUTANEOUS NIGHTLY
Qty: 30 ML | Refills: 3 | Status: SHIPPED | OUTPATIENT
Start: 2025-03-04 | End: 2026-03-04

## 2025-03-06 ENCOUNTER — APPOINTMENT (OUTPATIENT)
Dept: PHARMACY | Facility: HOSPITAL | Age: 80
End: 2025-03-06
Payer: MEDICARE

## 2025-03-06 DIAGNOSIS — E11.9 TYPE 2 DIABETES MELLITUS WITHOUT COMPLICATION, WITHOUT LONG-TERM CURRENT USE OF INSULIN (MULTI): ICD-10-CM

## 2025-03-06 DIAGNOSIS — E11.21 TYPE 2 DIABETES MELLITUS WITH DIABETIC NEPHROPATHY, UNSPECIFIED WHETHER LONG TERM INSULIN USE: ICD-10-CM

## 2025-03-06 NOTE — PROGRESS NOTES
Patient Assistance for Jardiance, Tresiba, Lantus, Ozempic approved through 9/20/25. Will have to be renewed prior to that date to prevent lapse in coverage. Medication(s) will be received at no cost to patient from [facility].       Patient ID: Saturnino Escobedo is a 79 y.o. male who presents for Diabetes.    Referring Provider: Bud Escalera MD  PCP: Bud Escalera MD Last visit with PCP: 1/9/25 Next visit with PCP: 5/8/25      Subjective   Treatment Adherence:   Patient did take medications today.   Number of missed doses in last 7 days: very seldom twice/month.      Preferred pharmacy: Mail order  Can patient afford prescribed medications: Yes, enrolled in PAP for Jardiance, Tresiba, Lantus, Ozempic        HPI    Today, patient still complains of poor sleep patterns. Goes to bed at 4am and wakes up at 1pm. Suggested melatonin 5mg OTC to aid sleep cycle. Good sleep hygiene. Will discuss with Dr. Escalera.    DIABETES MELLITUS TYPE 2:    Does patient follow with Endocrinology: No  Last optometry exam: couple months ago  Most recent visit in Podiatry: no-- patient denies sores or cuts on feet today. Bottom of feet hurt once in a while. Bought insoles. Not regular.      Current diabetic medications include:  Jardiance 25mg daily   Tresiba 30 units daily  Metformin 500mg BID  Ozempic 0.25mg weekly (3 doses completed)     Adverse Effects: none noted    Past diabetic medications include:  NPH    Starting weight: 196lb  Current weight: 189 lb    Glucose Readings:  Glucometer/CGM Type: Glucometer.   Previously used Mango 3, but found cost too expensive this year. States last CGM report showed 87% TIR with no lows.  Checked FBG once last week -134.       Any episodes of hypoglycemia? No, lowest ever was 74 .  Did patient treat episode of hypoglycemia appropriately? N/A   Has had episode where all of a sudden low when outside working. Nighttime if anything. Hasn't eaten yet. Feels like jelly.     Advised to use  "Glucose tablets. Protein meal within an hour in event of hypoglycemia  Does pt have proteinuria? unknown. Needs lab    Lifestyle:  Diet: sleeps late. Does not eat until 12-1pm. Coffee, cottage chese, yogurt. Supper around 7pm. Chicken. Sometimes messes up at night. Cookies, apples and peanut butter.  Physical Activity: golf when warm    Secondary Prevention:  Statin? Yes- rosuvastatin 20mg  ACE-I/ARB? Yes- losartan-hydrochlorothiazide 100-25mg daily  Nees new Rx  Aspirin? Yes    Review of Systems    No history Pancreatitis  No MTC    Objective     There were no vitals taken for this visit.   BP Readings from Last 4 Encounters:   01/09/25 122/74   01/07/25 131/87   09/12/24 140/80   07/15/24 (!) 141/94      There were no vitals filed for this visit.     Labs  Lab Results   Component Value Date    BILITOT 0.7 01/09/2025    CALCIUM 10.1 01/09/2025    CO2 29 01/09/2025     01/09/2025    CREATININE 1.08 01/09/2025    GLUCOSE 102 (H) 01/09/2025    ALKPHOS 102 01/09/2025    K 4.2 01/09/2025    PROT 7.8 01/09/2025     01/09/2025    AST 20 01/09/2025    ALT 19 01/09/2025    BUN 19 01/09/2025    ANIONGAP 16 01/09/2025    ALBUMIN 4.5 01/09/2025    GFRMALE 66 09/25/2023     Lab Results   Component Value Date    TRIG 215 (H) 01/09/2025    CHOL 140 01/09/2025    LDLCALC 59 01/09/2025    HDL 37.7 01/09/2025     Lab Results   Component Value Date    HGBA1C 6.7 (H) 01/09/2025       Current Outpatient Medications   Medication Instructions    amLODIPine (NORVASC) 10 mg, oral, Daily    aspirin 81 mg, Daily    DULoxetine (CYMBALTA) 60 mg, oral, Daily    fish oil concentrate (Omega-3) 120-180 mg capsule     insulin syringe-needle U-100 31G X 5/16\" 0.5 mL syringe Use to inject 1-4 times daily as directed.    Jardiance 25 mg, oral, Daily    Lantus Solostar U-100 Insulin 30 Units, subcutaneous, Nightly, Take as directed per insulin instructions.    latanoprost (Xalatan) 0.005 % ophthalmic solution 1 drop, Nightly    " "losartan-hydrochlorothiazide (Hyzaar) 100-25 mg tablet 1 tablet, oral, Daily    metFORMIN (GLUCOPHAGE) 500 mg, oral, 2 times daily (morning and late afternoon)    metoprolol tartrate (LOPRESSOR) 50 mg, oral, 2 times daily    MULTIVITAMIN ORAL Daily RT    pen needle, diabetic 32 gauge x 5/32\" needle Use to inject insulin once daily as directed    rosuvastatin (CRESTOR) 20 mg, oral, Daily    semaglutide 0.25 mg or 0.5 mg (2 mg/3 mL) pen injector Inject 0.25 mg under the skin 1 (one) time per week for 28 days, THEN 0.5 mg 1 (one) time per week for 28 days.    timoloL maleate 0.5 % drops, once daily 1 drop, 2 times daily         Drug Interactions;  None at time of review    Assessment/Plan   Problem List Items Addressed This Visit             ICD-10-CM    Type 2 diabetes mellitus E11.9     Patient's goal A1c is < 7%.  Is pt at goal? Yes, 6.7%  Patient's SMBGs are at goal (FBG ~130 mg/dL)  Rationale for plan: BG well controlled on metformin 500mg BID, Jardiance 25mg daily, Tresiba 30 units once daily and Ozempic 0.25mg weekly. Tolerating Ozempic well without side effects. Has lost ~7 pounds in the first month. Patient recently decided Mango 3 was too expensive with beginning of the year copay. Patient would like to go up to next dose of Ozempic. Aim to reduce insulin as Ozempic increases. Reinforced that patient must keep eating nutritious food as appetite is suppressed.     Medication Changes:  INCREASE  Ozempic 0.5mg once weekly  DECREASE  Tresiba 24 units when starting Ozempic  Insurance prefers Lantus. Switch to Lantus when Tresiba runs out.  CONTINUE  Jardiance 25 mg once daily  Metformin 500mg BID    Monitoring and Education:  UACR order placed  Titrate insulin, Ozempic  Educated on administration, MOA, side effects of Ozempic           Relevant Orders    Referral to Clinical Pharmacy       Follow-up:  4/3/24 1:00pm.     Time spent with pt: Total length of time 20 (minutes) of the encounter and more than 50% was " spent counseling the patient.    Brittanie Dunn, PharmD    Continue all meds under the continuation of care with the referring provider and clinical pharmacy team.

## 2025-03-06 NOTE — ASSESSMENT & PLAN NOTE
Patient's goal A1c is < 7%.  Is pt at goal? Yes, 6.7%  Patient's SMBGs are at goal (FBG ~130 mg/dL)  Rationale for plan: BG well controlled on metformin 500mg BID, Jardiance 25mg daily, Tresiba 30 units once daily and Ozempic 0.25mg weekly. Tolerating Ozempic well without side effects. Has lost ~7 pounds in the first month. Patient recently decided Mango 3 was too expensive with beginning of the year copay. Patient would like to go up to next dose of Ozempic. Aim to reduce insulin as Ozempic increases. Reinforced that patient must keep eating nutritious food as appetite is suppressed.     Medication Changes:  INCREASE  Ozempic 0.5mg once weekly  DECREASE  Tresiba 24 units when starting Ozempic  Insurance prefers Lantus. Switch to Lantus when Tresiba runs out.  CONTINUE  Jardiance 25 mg once daily  Metformin 500mg BID    Monitoring and Education:  UACR order placed  Titrate insulin, Ozempic  Educated on administration, MOA, side effects of Ozempic

## 2025-03-07 PROCEDURE — RXMED WILLOW AMBULATORY MEDICATION CHARGE

## 2025-03-11 ENCOUNTER — PHARMACY VISIT (OUTPATIENT)
Dept: PHARMACY | Facility: CLINIC | Age: 80
End: 2025-03-11
Payer: COMMERCIAL

## 2025-03-17 PROCEDURE — RXMED WILLOW AMBULATORY MEDICATION CHARGE

## 2025-03-20 ENCOUNTER — PHARMACY VISIT (OUTPATIENT)
Dept: PHARMACY | Facility: CLINIC | Age: 80
End: 2025-03-20
Payer: COMMERCIAL

## 2025-04-03 ENCOUNTER — APPOINTMENT (OUTPATIENT)
Dept: PHARMACY | Facility: HOSPITAL | Age: 80
End: 2025-04-03
Payer: MEDICARE

## 2025-04-03 DIAGNOSIS — E11.9 TYPE 2 DIABETES MELLITUS WITHOUT COMPLICATION, WITHOUT LONG-TERM CURRENT USE OF INSULIN: ICD-10-CM

## 2025-04-03 NOTE — PROGRESS NOTES
" Patient Assistance for Jardiance, Tresiba, Lantus, Ozempic approved through 9/20/25. Will have to be renewed prior to that date to prevent lapse in coverage. Medication(s) will be received at no cost to patient from [facility].       Patient ID: Saturnino Escobedo is a 79 y.o. male who presents for Diabetes.    Referring Provider: Bud Escalera MD  PCP: Bud Escalera MD Last visit with PCP: 1/9/25 Next visit with PCP: 5/8/25      Subjective   Treatment Adherence:   Patient did take medications today.   Number of missed doses in last 7 days: very seldom twice/month.      Preferred pharmacy: Mail order  Can patient afford prescribed medications: Yes, enrolled in PAP for Jardiance, Tresiba, Lantus, Ozempic        HPI    Today, patient still complains of poor sleep patterns. Goes to bed at 4am and wakes up at 1pm. Suggested melatonin 5mg OTC to aid sleep cycle. Good sleep hygiene. Will discuss with Dr. Escalera.  Patient feels more tired and \"slumpy\" lately. Unsure if rainy weather, medication side effects, untreated depression or a \"few things not going his way\" at [employer] lately are contributing. Wants to talk about anti-depressant with Dr. Escalera next month.      DIABETES MELLITUS TYPE 2:    Does patient follow with Endocrinology: No  Last optometry exam: couple months ago  Most recent visit in Podiatry: no-- patient denies sores or cuts on feet today. Bottom of feet hurt once in a while. Bought insoles. Not regular.      Current diabetic medications include:  Jardiance 25mg daily   Tresiba 26 units daily  Metformin 500mg BID  Ozempic 0.5mg weekly    Adverse Effects: none noted    Past diabetic medications include:  NPH    Starting weight: 196lb  Current weight: 189 lb    Glucose Readings:  Glucometer/CGM Type: Glucometer.   Will be restarting FSL3  Am 120s, 130-150. Nighttime 150      Any episodes of hypoglycemia? No, lowest ever was 74 .  Did patient treat episode of hypoglycemia appropriately? N/A   Has had " "episode where all of a sudden low when outside working. Nighttime if anything. Hasn't eaten yet. Feels like jelly.     Advised to use Glucose tablets. Protein meal within an hour in event of hypoglycemia  Does pt have proteinuria? unknown. Needs lab    Lifestyle:  Diet: sleeps late. Does not eat until 12-1pm. Coffee, cottage chese, yogurt. Supper around 7pm. Chicken. Sometimes messes up at night. Cookies, apples and peanut butter.  Physical Activity: golf when warm    Secondary Prevention:  Statin? Yes- rosuvastatin 20mg  ACE-I/ARB? Yes- losartan-hydrochlorothiazide 100-25mg daily  Nees new Rx  Aspirin? Yes    Review of Systems    No history Pancreatitis  No MTC    Objective     There were no vitals taken for this visit.   BP Readings from Last 4 Encounters:   01/09/25 122/74   01/07/25 131/87   09/12/24 140/80   07/15/24 (!) 141/94      There were no vitals filed for this visit.     Labs  Lab Results   Component Value Date    BILITOT 0.7 01/09/2025    CALCIUM 10.1 01/09/2025    CO2 29 01/09/2025     01/09/2025    CREATININE 1.08 01/09/2025    GLUCOSE 102 (H) 01/09/2025    ALKPHOS 102 01/09/2025    K 4.2 01/09/2025    PROT 7.8 01/09/2025     01/09/2025    AST 20 01/09/2025    ALT 19 01/09/2025    BUN 19 01/09/2025    ANIONGAP 16 01/09/2025    ALBUMIN 4.5 01/09/2025    GFRMALE 66 09/25/2023     Lab Results   Component Value Date    TRIG 215 (H) 01/09/2025    CHOL 140 01/09/2025    LDLCALC 59 01/09/2025    HDL 37.7 01/09/2025     Lab Results   Component Value Date    HGBA1C 6.7 (H) 01/09/2025       Current Outpatient Medications   Medication Instructions    amLODIPine (NORVASC) 10 mg, oral, Daily    aspirin 81 mg, Daily    DULoxetine (CYMBALTA) 60 mg, oral, Daily    fish oil concentrate (Omega-3) 120-180 mg capsule     insulin syringe-needle U-100 31G X 5/16\" 0.5 mL syringe Use to inject 1-4 times daily as directed.    Jardiance 25 mg, oral, Daily    Lantus Solostar U-100 Insulin 30 Units, subcutaneous, " "Nightly, Take as directed per insulin instructions.    latanoprost (Xalatan) 0.005 % ophthalmic solution 1 drop, Nightly    losartan-hydrochlorothiazide (Hyzaar) 100-25 mg tablet 1 tablet, oral, Daily    metFORMIN (GLUCOPHAGE) 500 mg, oral, 2 times daily (morning and late afternoon)    metoprolol tartrate (LOPRESSOR) 50 mg, oral, 2 times daily    MULTIVITAMIN ORAL Daily RT    Ozempic 0.5 mg, subcutaneous, Weekly    pen needle, diabetic 32 gauge x 5/32\" needle Use to inject insulin once daily as directed    rosuvastatin (CRESTOR) 20 mg, oral, Daily    timoloL maleate 0.5 % drops, once daily 1 drop, 2 times daily         Drug Interactions;  None at time of review    Assessment/Plan   Problem List Items Addressed This Visit             ICD-10-CM    Type 2 diabetes mellitus E11.9     Patient's goal A1c is < 7%.  Is pt at goal? Yes, 6.7%  Patient's SMBGs are above goal (FBG ~130-150 mg/dL)  Rationale for plan: BG relatively well controlled on metformin 500mg BID, Jardiance 25mg daily, Tresiba 24 units once daily and Ozempic 0.5mg weekly. Tolerating Ozempic well without side effects. Uncertain how much weight he lost in the last month. Got new shipment of Mango 3 sensors this week. Increase Tresiba slightly to improve FBG. Continue Ozempic 0.5mg for another month due to patient complaints of low energy.    Medication Changes:  CONTINUE  Ozempic 0.5mg once weekly  Jardiance 25 mg once daily  Metformin 500mg BID  INCREASE  Tresiba 26 units when starting Ozempic  Insurance prefers Lantus. Switch to Lantus when Tresiba runs out.    Monitoring and Education:  UACR order placed  Titrate insulin, Ozempic  Educated on administration, MOA, side effects of Ozempic           Relevant Orders    Referral to Clinical Pharmacy         Follow-up:  6/5/24 1:00pm.     Time spent with pt: Total length of time 20 (minutes) of the encounter and more than 50% was spent counseling the patient.    Brittanie Dunn, PharmD    Continue all meds " under the continuation of care with the referring provider and clinical pharmacy team.

## 2025-04-03 NOTE — ASSESSMENT & PLAN NOTE
Patient's goal A1c is < 7%.  Is pt at goal? Yes, 6.7%  Patient's SMBGs are above goal (FBG ~130-150 mg/dL)  Rationale for plan: BG relatively well controlled on metformin 500mg BID, Jardiance 25mg daily, Tresiba 24 units once daily and Ozempic 0.5mg weekly. Tolerating Ozempic well without side effects. Uncertain how much weight he lost in the last month. Got new shipment of [employer] 3 sensors this week. Increase Tresiba slightly to improve FBG. Continue Ozempic 0.5mg for another month due to patient complaints of low energy.    Medication Changes:  CONTINUE  Ozempic 0.5mg once weekly  Jardiance 25 mg once daily  Metformin 500mg BID  INCREASE  Tresiba 26 units when starting Ozempic  Insurance prefers Lantus. Switch to Lantus when Tresiba runs out.    Monitoring and Education:  UACR order placed  Titrate insulin, Ozempic  Educated on administration, MOA, side effects of Ozempic     Pt seen bedside for OT evaluation Pt seen bedside for OT evaluation [id number]

## 2025-04-23 PROCEDURE — RXMED WILLOW AMBULATORY MEDICATION CHARGE

## 2025-04-24 ENCOUNTER — PHARMACY VISIT (OUTPATIENT)
Dept: PHARMACY | Facility: CLINIC | Age: 80
End: 2025-04-24
Payer: COMMERCIAL

## 2025-04-25 LAB
ALBUMIN SERPL-MCNC: 4.7 G/DL (ref 3.6–5.1)
ALP SERPL-CCNC: 93 U/L (ref 35–144)
ALT SERPL-CCNC: 20 U/L (ref 9–46)
ANION GAP SERPL CALCULATED.4IONS-SCNC: 15 MMOL/L (CALC) (ref 7–17)
AST SERPL-CCNC: 24 U/L (ref 10–35)
BILIRUB SERPL-MCNC: 0.6 MG/DL (ref 0.2–1.2)
BUN SERPL-MCNC: 28 MG/DL (ref 7–25)
CALCIUM SERPL-MCNC: 9.9 MG/DL (ref 8.6–10.3)
CHLORIDE SERPL-SCNC: 100 MMOL/L (ref 98–110)
CHOLEST SERPL-MCNC: 132 MG/DL
CHOLEST/HDLC SERPL: 3.3 (CALC)
CO2 SERPL-SCNC: 26 MMOL/L (ref 20–32)
CREAT SERPL-MCNC: 1.16 MG/DL (ref 0.7–1.28)
EGFRCR SERPLBLD CKD-EPI 2021: 64 ML/MIN/1.73M2
EST. AVERAGE GLUCOSE BLD GHB EST-MCNC: 157 MG/DL
EST. AVERAGE GLUCOSE BLD GHB EST-SCNC: 8.7 MMOL/L
GLUCOSE SERPL-MCNC: 108 MG/DL (ref 65–99)
HBA1C MFR BLD: 7.1 %
HDLC SERPL-MCNC: 40 MG/DL
LDLC SERPL CALC-MCNC: 58 MG/DL (CALC)
NONHDLC SERPL-MCNC: 92 MG/DL (CALC)
POTASSIUM SERPL-SCNC: 3.9 MMOL/L (ref 3.5–5.3)
PROT SERPL-MCNC: 7.6 G/DL (ref 6.1–8.1)
SODIUM SERPL-SCNC: 141 MMOL/L (ref 135–146)
TRIGL SERPL-MCNC: 327 MG/DL

## 2025-05-07 ENCOUNTER — APPOINTMENT (OUTPATIENT)
Dept: PHARMACY | Facility: HOSPITAL | Age: 80
End: 2025-05-07
Payer: MEDICARE

## 2025-05-08 ENCOUNTER — APPOINTMENT (OUTPATIENT)
Dept: PRIMARY CARE | Facility: CLINIC | Age: 80
End: 2025-05-08
Payer: MEDICARE

## 2025-05-08 VITALS
TEMPERATURE: 97.6 F | HEIGHT: 63 IN | WEIGHT: 186 LBS | SYSTOLIC BLOOD PRESSURE: 132 MMHG | HEART RATE: 80 BPM | DIASTOLIC BLOOD PRESSURE: 84 MMHG | OXYGEN SATURATION: 100 % | BODY MASS INDEX: 32.96 KG/M2

## 2025-05-08 DIAGNOSIS — E11.21 TYPE 2 DIABETES MELLITUS WITH DIABETIC NEPHROPATHY, UNSPECIFIED WHETHER LONG TERM INSULIN USE: Primary | ICD-10-CM

## 2025-05-08 DIAGNOSIS — Z00.00 MEDICARE ANNUAL WELLNESS VISIT, SUBSEQUENT: ICD-10-CM

## 2025-05-08 DIAGNOSIS — E66.01 OBESITY, MORBID (MULTI): ICD-10-CM

## 2025-05-08 DIAGNOSIS — K21.00 GASTROESOPHAGEAL REFLUX DISEASE WITH ESOPHAGITIS WITHOUT HEMORRHAGE: ICD-10-CM

## 2025-05-08 DIAGNOSIS — E11.21 TYPE 2 DIABETES MELLITUS WITH DIABETIC NEPHROPATHY, UNSPECIFIED WHETHER LONG TERM INSULIN USE: ICD-10-CM

## 2025-05-08 DIAGNOSIS — F32.5 MAJOR DEPRESSIVE DISORDER IN FULL REMISSION, UNSPECIFIED WHETHER RECURRENT: ICD-10-CM

## 2025-05-08 DIAGNOSIS — Z71.89 DO NOT RESUSCITATE DISCUSSION: ICD-10-CM

## 2025-05-08 DIAGNOSIS — I10 PRIMARY HYPERTENSION: ICD-10-CM

## 2025-05-08 PROCEDURE — RXMED WILLOW AMBULATORY MEDICATION CHARGE

## 2025-05-08 PROCEDURE — 1158F ADVNC CARE PLAN TLK DOCD: CPT | Performed by: FAMILY MEDICINE

## 2025-05-08 PROCEDURE — 99214 OFFICE O/P EST MOD 30 MIN: CPT | Performed by: FAMILY MEDICINE

## 2025-05-08 PROCEDURE — 3079F DIAST BP 80-89 MM HG: CPT | Performed by: FAMILY MEDICINE

## 2025-05-08 PROCEDURE — 1159F MED LIST DOCD IN RCRD: CPT | Performed by: FAMILY MEDICINE

## 2025-05-08 PROCEDURE — 1170F FXNL STATUS ASSESSED: CPT | Performed by: FAMILY MEDICINE

## 2025-05-08 PROCEDURE — 1036F TOBACCO NON-USER: CPT | Performed by: FAMILY MEDICINE

## 2025-05-08 PROCEDURE — 3075F SYST BP GE 130 - 139MM HG: CPT | Performed by: FAMILY MEDICINE

## 2025-05-08 PROCEDURE — G0439 PPPS, SUBSEQ VISIT: HCPCS | Performed by: FAMILY MEDICINE

## 2025-05-08 RX ORDER — BUPROPION HYDROCHLORIDE 150 MG/1
150 TABLET ORAL EVERY MORNING
Qty: 90 UNSPECIFIED | Refills: 1 | Status: SHIPPED | OUTPATIENT
Start: 2025-05-08 | End: 2025-11-04

## 2025-05-08 RX ORDER — TIRZEPATIDE 7.5 MG/.5ML
7.5 INJECTION, SOLUTION SUBCUTANEOUS WEEKLY
Qty: 2 ML | Refills: 0 | Status: SHIPPED | OUTPATIENT
Start: 2025-06-08

## 2025-05-08 RX ORDER — AMLODIPINE BESYLATE 10 MG/1
10 TABLET ORAL DAILY
Qty: 90 TABLET | Refills: 3 | Status: SHIPPED | OUTPATIENT
Start: 2025-05-08

## 2025-05-08 RX ORDER — TIRZEPATIDE 10 MG/.5ML
10 INJECTION, SOLUTION SUBCUTANEOUS WEEKLY
Qty: 2 ML | Refills: 3 | Status: SHIPPED | OUTPATIENT
Start: 2025-07-08

## 2025-05-08 RX ORDER — FAMOTIDINE 20 MG/1
20 TABLET, FILM COATED ORAL 2 TIMES DAILY
Qty: 180 UNSPECIFIED | Refills: 1 | Status: SHIPPED | OUTPATIENT
Start: 2025-05-08 | End: 2025-11-04

## 2025-05-08 RX ORDER — TIRZEPATIDE 5 MG/.5ML
5 INJECTION, SOLUTION SUBCUTANEOUS WEEKLY
Qty: 2 ML | Refills: 5 | Status: SHIPPED | OUTPATIENT
Start: 2025-05-08

## 2025-05-08 RX ORDER — TIRZEPATIDE 5 MG/.5ML
5 INJECTION, SOLUTION SUBCUTANEOUS WEEKLY
Qty: 2 ML | Refills: 5 | Status: SHIPPED | OUTPATIENT
Start: 2025-05-08 | End: 2025-05-08 | Stop reason: SDUPTHER

## 2025-05-08 RX ORDER — METOPROLOL TARTRATE 50 MG/1
50 TABLET ORAL 2 TIMES DAILY
Qty: 180 TABLET | Refills: 3 | Status: SHIPPED | OUTPATIENT
Start: 2025-05-08 | End: 2026-05-08

## 2025-05-08 ASSESSMENT — ACTIVITIES OF DAILY LIVING (ADL)
TAKING_MEDICATION: INDEPENDENT
DOING_HOUSEWORK: INDEPENDENT
GROCERY_SHOPPING: INDEPENDENT
DRESSING: INDEPENDENT
MANAGING_FINANCES: INDEPENDENT
BATHING: INDEPENDENT

## 2025-05-08 ASSESSMENT — ENCOUNTER SYMPTOMS: HYPERTENSION: 1

## 2025-05-08 NOTE — PROGRESS NOTES
Subjective   Patient ID: Saturnino Escobedo is a 79 y.o. male who presents for Medicare Annual Wellness Visit Subsequent and Hypertension (Recheck, review labs.).  Hypertension    Hyperlipidemia      HPI: Annual Wellness visit. The patient has felt depressed over the past 6 weeks, tolerating Cymbalta well. No trouble with bathing, dressing, eating, grooming, walking, toileting, house work, managing money or transportation. Reports no falls. The home has functioning smoke alarms, grab bars in the bathroom and handrails on the stairs. The home does not have poor lighting. Reports hearing difficulty in the ears bilaterally. Currently following a diabetic/low calorie diet.   Has had some urinary incontinence. Has noticed some recent issues with memory, using techniques like putting all his valuables in the same place.     1. lipids: well controlled     2. Depression: has been worse.  Feels like he is done with the world.     3. HTN: weight is good, stable     4. DM2: Improving diet.  A1c was 7.1.  Taking Ozempic but weight loss has stabilized.     5. OA: stable      Counseled pt on living will: has living will    AAA screening: NA     Prostate CA screen:     CV screening: checking CA score    Colonoscopy: done in 2021 and normal    Diabetes screening: treated    Glaucoma screen:  sees optho, last appointment 6 months ago    CT chest tob screen: NA    Vaccines:  talk to pharmacist    Current Outpatient Medications on File Prior to Visit   Medication Sig Dispense Refill    amLODIPine (Norvasc) 10 mg tablet TAKE 1 TABLET EVERY DAY 90 tablet 3    aspirin 81 mg EC tablet Take 1 tablet (81 mg) by mouth once daily.      DULoxetine (Cymbalta) 60 mg DR capsule Take 1 capsule (60 mg) by mouth once daily. 90 capsule 3    empagliflozin (Jardiance) 25 mg Take 1 tablet (25 mg) by mouth once daily. 90 tablet 3    fish oil concentrate (Omega-3) 120-180 mg capsule       insulin glargine (Lantus Solostar U-100 Insulin) 100 unit/mL (3 mL)  "pen Inject 30 Units under the skin once daily at bedtime. Take as directed per insulin instructions. 30 mL 3    latanoprost (Xalatan) 0.005 % ophthalmic solution Administer 1 drop into both eyes once daily at bedtime.      losartan-hydrochlorothiazide (Hyzaar) 100-25 mg tablet Take 1 tablet by mouth once daily. 90 tablet 3    metFORMIN (Glucophage) 500 mg tablet Take 1 tablet (500 mg) by mouth 2 times daily (morning and late afternoon). 180 tablet 3    metoprolol tartrate (Lopressor) 50 mg tablet Take 1 tablet by mouth 2 times a day. 180 tablet 3    MULTIVITAMIN ORAL Take by mouth once daily.      pen needle, diabetic 32 gauge x 5/32\" needle Use to inject insulin once daily as directed 100 each 11    rosuvastatin (Crestor) 20 mg tablet Take 1 tablet (20 mg) by mouth once daily. 90 tablet 3    timoloL maleate 0.5 % drops, once daily Administer 1 drop into affected eye(s) 2 times a day.      [DISCONTINUED] semaglutide 0.25 mg or 0.5 mg (2 mg/3 mL) pen injector Inject 0.5 mg under the skin 1 (one) time per week. 3 mL 1     No current facility-administered medications on file prior to visit.        Vitals:    05/08/25 0942   BP: 132/84   Pulse: 80   Temp: 36.4 °C (97.6 °F)   SpO2: 100%       Review of Systems   All other systems reviewed and are negative.      Objective     Physical Exam  Vitals reviewed.   Constitutional:       General: He is not in acute distress.     Appearance: Normal appearance. He is well-developed. He is not diaphoretic.   HENT:      Head: Normocephalic and atraumatic.      Right Ear: Tympanic membrane normal.      Left Ear: Tympanic membrane normal.      Nose: Nose normal.      Mouth/Throat:      Mouth: Mucous membranes are moist.   Eyes:      Pupils: Pupils are equal, round, and reactive to light.   Cardiovascular:      Rate and Rhythm: Normal rate and regular rhythm.      Heart sounds: Normal heart sounds. No murmur heard.     No friction rub. No gallop.   Pulmonary:      Effort: Pulmonary " effort is normal.      Breath sounds: Normal breath sounds. No rales.   Abdominal:      General: Bowel sounds are normal.      Palpations: Abdomen is soft.      Tenderness: There is no abdominal tenderness.   Musculoskeletal:      Cervical back: Normal range of motion and neck supple.   Skin:     General: Skin is warm and dry.   Neurological:      Mental Status: He is alert.   Psychiatric:         Mood and Affect: Mood normal.         Orders Only on 04/24/2025   Component Date Value Ref Range Status    CHOLESTEROL, TOTAL 04/24/2025 132  <200 mg/dL Final    HDL CHOLESTEROL 04/24/2025 40  > OR = 40 mg/dL Final    TRIGLYCERIDES 04/24/2025 327 (H)  <150 mg/dL Final    Comment:    If a non-fasting specimen was collected, consider  repeat triglyceride testing on a fasting specimen  if clinically indicated.   Jessy et al. J. of Clin. Lipidol. 2015;9:129-169.         LDL-CHOLESTEROL 04/24/2025 58  mg/dL (calc) Final    Comment: Reference range: <100     Desirable range <100 mg/dL for primary prevention;    <70 mg/dL for patients with CHD or diabetic patients   with > or = 2 CHD risk factors.     LDL-C is now calculated using the Benedicto-Ludwig   calculation, which is a validated novel method providing   better accuracy than the Friedewald equation in the   estimation of LDL-C.   Benedicto SS et al. FANNY. 2013;310(19): 0615-1899   (http://education.Navidog.Bomberbot/faq/ZSG633)      CHOL/HDLC RATIO 04/24/2025 3.3  <5.0 (calc) Final    NON HDL CHOLESTEROL 04/24/2025 92  <130 mg/dL (calc) Final    Comment: For patients with diabetes plus 1 major ASCVD risk   factor, treating to a non-HDL-C goal of <100 mg/dL   (LDL-C of <70 mg/dL) is considered a therapeutic   option.      GLUCOSE 04/24/2025 108 (H)  65 - 99 mg/dL Final    Comment:               Fasting reference interval     For someone without known diabetes, a glucose value  between 100 and 125 mg/dL is consistent with  prediabetes and should be confirmed with  a  follow-up test.         UREA NITROGEN (BUN) 04/24/2025 28 (H)  7 - 25 mg/dL Final    CREATININE 04/24/2025 1.16  0.70 - 1.28 mg/dL Final    EGFR 04/24/2025 64  > OR = 60 mL/min/1.73m2 Final    SODIUM 04/24/2025 141  135 - 146 mmol/L Final    POTASSIUM 04/24/2025 3.9  3.5 - 5.3 mmol/L Final    CHLORIDE 04/24/2025 100  98 - 110 mmol/L Final    CARBON DIOXIDE 04/24/2025 26  20 - 32 mmol/L Final    ELECTROLYTE BALANCE 04/24/2025 15  7 - 17 mmol/L (calc) Final    CALCIUM 04/24/2025 9.9  8.6 - 10.3 mg/dL Final    PROTEIN, TOTAL 04/24/2025 7.6  6.1 - 8.1 g/dL Final    ALBUMIN 04/24/2025 4.7  3.6 - 5.1 g/dL Final    BILIRUBIN, TOTAL 04/24/2025 0.6  0.2 - 1.2 mg/dL Final    ALKALINE PHOSPHATASE 04/24/2025 93  35 - 144 U/L Final    AST 04/24/2025 24  10 - 35 U/L Final    ALT 04/24/2025 20  9 - 46 U/L Final    HEMOGLOBIN A1c 04/24/2025 7.1 (H)  <5.7 % Final    Comment: For someone without known diabetes, a hemoglobin A1c  value of 6.5% or greater indicates that they may have   diabetes and this should be confirmed with a follow-up   test.     For someone with known diabetes, a value <7% indicates   that their diabetes is well controlled and a value   greater than or equal to 7% indicates suboptimal   control. A1c targets should be individualized based on   duration of diabetes, age, comorbid conditions, and   other considerations.     Currently, no consensus exists regarding use of  hemoglobin A1c for diagnosis of diabetes for children.          eAG (mg/dL) 04/24/2025 157  mg/dL Final    eAG (mmol/L) 04/24/2025 8.7  mmol/L Final       Assessment/Plan   Problem List Items Addressed This Visit       Obesity, morbid (Multi)    Relevant Medications    tirzepatide (Mounjaro) 5 mg/0.5 mL pen injector    tirzepatide (Mounjaro) 7.5 mg/0.5 mL pen injector (Start on 6/8/2025)    tirzepatide (Mounjaro) 10 mg/0.5 mL pen injector (Start on 7/8/2025)    famotidine (Pepcid) 20 mg tablet    Type 2 diabetes mellitus with diabetic  nephropathy, unspecified whether long term insulin use - Primary    Relevant Medications    tirzepatide (Mounjaro) 5 mg/0.5 mL pen injector    tirzepatide (Mounjaro) 7.5 mg/0.5 mL pen injector (Start on 6/8/2025)    tirzepatide (Mounjaro) 10 mg/0.5 mL pen injector (Start on 7/8/2025)    famotidine (Pepcid) 20 mg tablet     Other Visit Diagnoses         Gastroesophageal reflux disease with esophagitis without hemorrhage        Relevant Medications    famotidine (Pepcid) 20 mg tablet          Health stable  Switching to Mounjaro.  Adding on wellbutrin for depression.  Follow up in 4 months

## 2025-05-09 ENCOUNTER — PHARMACY VISIT (OUTPATIENT)
Dept: PHARMACY | Facility: CLINIC | Age: 80
End: 2025-05-09
Payer: COMMERCIAL

## 2025-05-29 ENCOUNTER — APPOINTMENT (OUTPATIENT)
Dept: PHARMACY | Facility: HOSPITAL | Age: 80
End: 2025-05-29
Payer: MEDICARE

## 2025-05-29 DIAGNOSIS — E11.9 TYPE 2 DIABETES MELLITUS WITHOUT COMPLICATION, WITHOUT LONG-TERM CURRENT USE OF INSULIN: ICD-10-CM

## 2025-05-29 PROCEDURE — RXMED WILLOW AMBULATORY MEDICATION CHARGE

## 2025-05-29 NOTE — PROGRESS NOTES
Patient Assistance for Jardiance, Tresiba, Lantus, Ozempic approved through 9/20/25. Will have to be renewed prior to that date to prevent lapse in coverage. Medication(s) will be received at no cost to patient from [facility].       Patient ID: Saturnino Escobedo is a 79 y.o. male who presents for Diabetes.    Referring Provider: Bud Escalera MD  PCP: Bud Escalera MD Last visit with PCP: 1/9/25 Next visit with PCP: 5/8/25      Subjective   Treatment Adherence:   Patient did take medications today.   Number of missed doses in last 7 days: very seldom twice/month.      Preferred pharmacy: Mail order  Can patient afford prescribed medications: Yes, enrolled in PAP for Jardiance, Tresiba, Lantus, Ozempic, Mounjaro       HPI    Today, patient reports feeling well. Started famotidine, bupropion last month. Thought they were both for depression. Advised that patient can try reducing famotidine to once daily if he thinks transition to Mounjaro stopped heart burn    Getting up much earlier. Has a lot going on. Ana, flowers, fertilizer.       DIABETES MELLITUS TYPE 2:    Does patient follow with Endocrinology: No  Last optometry exam: couple months ago  Most recent visit in Podiatry: no-- patient denies sores or cuts on feet today. Bottom of feet hurt once in a while. Bought insoles. Not regular.      Current diabetic medications include:  Jardiance 25mg daily   Tresiba 26 units daily  Metformin 500mg BID  Mounjaro 5mg   Lost another 4 pounds- 16 pounds total. Not very hungry at all most of the time.   Last week or so, has been eating even if not hungry. Heartburn yesterday. 1 hamburger yesterday with no bread.     Adverse Effects: none noted    Past diabetic medications include:  NPH    Starting weight: 196lb  Current weight: 182 lb    Glucose Readings:  Glucometer/CGM Type: Glucometer.   Will be restarting FSL3  Am 120s, 130-150. Nighttime 150    7am- low 107, 100, 110  Eat anything- banana 122 to  143  Pricking himself to compare blood test with monitor. Noon- monitor 124, blood 143. 136-146  145 at 12:30 163  130-127  112-118  133- 167    252, 253 late afternoon 5/25. Low 5/6 at 3am  Nighttime is stead 100-110. When he eats, 160  Average: 136 for last 7 days  Avergae last 14d: 130      Time in target (7d)  6% above  94% in  0% below    Time in target (14d)  5% above  95%  0% below      147 now. Eating cottage cheese    Time in target  Above:     Any episodes of hypoglycemia? No, lowest ever was 74.  Did patient treat episode of hypoglycemia appropriately? N/A   Has had episode where all of a sudden low when outside working. Nighttime if anything. Hasn't eaten yet. Feels like jelly.     Advised to use Glucose tablets. Protein meal within an hour in event of hypoglycemia  Does pt have proteinuria? unknown. Needs lab    Lifestyle:  Diet: sleeps late. Does not eat until 12-1pm. Coffee, cottage chese, yogurt. Supper around 7pm. Chicken. Sometimes messes up at night. Cookies, apples and peanut butter.  Physical Activity: golf when warm    Secondary Prevention:  Statin? Yes- rosuvastatin 20mg  ACE-I/ARB? Yes- losartan-hydrochlorothiazide 100-25mg daily  Nees new Rx  Aspirin? Yes    Review of Systems    No history Pancreatitis  No MTC    Objective     There were no vitals taken for this visit.   BP Readings from Last 4 Encounters:   05/08/25 132/84   01/09/25 122/74   01/07/25 131/87   09/12/24 140/80      There were no vitals filed for this visit.     Labs  Lab Results   Component Value Date    BILITOT 0.6 04/24/2025    CALCIUM 9.9 04/24/2025    CO2 26 04/24/2025     04/24/2025    CREATININE 1.16 04/24/2025    GLUCOSE 108 (H) 04/24/2025    ALKPHOS 93 04/24/2025    K 3.9 04/24/2025    PROT 7.6 04/24/2025     04/24/2025    AST 24 04/24/2025    ALT 20 04/24/2025    BUN 28 (H) 04/24/2025    ANIONGAP 15 04/24/2025    ALBUMIN 4.7 04/24/2025    GFRMALE 66 09/25/2023     Lab Results   Component Value Date     "TRIG 327 (H) 04/24/2025    CHOL 132 04/24/2025    LDLCALC 58 04/24/2025    HDL 40 04/24/2025     Lab Results   Component Value Date    HGBA1C 7.1 (H) 04/24/2025       Current Outpatient Medications   Medication Instructions    amLODIPine (NORVASC) 10 mg, oral, Daily    aspirin 81 mg, Daily    buPROPion XL (WELLBUTRIN XL) 150 mg, oral, Every morning, Do not crush, chew, or split.    DULoxetine (CYMBALTA) 60 mg, oral, Daily    famotidine (PEPCID) 20 mg, oral, 2 times daily    fish oil concentrate (Omega-3) 120-180 mg capsule     Jardiance 25 mg, oral, Daily    Lantus Solostar U-100 Insulin 30 Units, subcutaneous, Nightly, Take as directed per insulin instructions.    latanoprost (Xalatan) 0.005 % ophthalmic solution 1 drop, Nightly    losartan-hydrochlorothiazide (Hyzaar) 100-25 mg tablet 1 tablet, oral, Daily    metFORMIN (GLUCOPHAGE) 500 mg, oral, 2 times daily (morning and late afternoon)    metoprolol tartrate (LOPRESSOR) 50 mg, oral, 2 times daily    [START ON 6/8/2025] Mounjaro 7.5 mg, subcutaneous, Weekly    [START ON 7/8/2025] Mounjaro 10 mg, subcutaneous, Weekly    Mounjaro 5 mg, subcutaneous, Weekly, Compound with B12, titrate upwards as desired.    MULTIVITAMIN ORAL Daily RT    pen needle, diabetic 32 gauge x 5/32\" needle Use to inject insulin once daily as directed    rosuvastatin (CRESTOR) 20 mg, oral, Daily    timoloL maleate 0.5 % drops, once daily 1 drop, 2 times daily         Drug Interactions;  None at time of review    Assessment/Plan   Problem List Items Addressed This Visit           ICD-10-CM    Type 2 diabetes mellitus E11.9    Patient's goal A1c is < 7%.  Is pt at goal? Yes, 6.7%  Patient's SMBGs are at goal (FBG ~100-120 mg/dL)  Rationale for plan: BG well controlled on metformin 500mg BID, Jardiance 25mg daily, Tresiba 26 units once daily and Mounjaro 5mg weekly. Tolerating Mounjaro well without side effects. Given excellent glucose control and history of side effects on Ozempic, will slow " down titration of Mounjaro. Plan to increase dose in 1 month.  Medication Changes:  CONTINUE  Mounjaro 5mg once weekly  Jardiance 25 mg once daily  Metformin 500mg BID  Tresiba 26 units when starting Ozempic    Monitoring and Education:  UACR order placed  Titrate insulin, Mounjaro  Educated on administration, MOA, side effects of Mounjaro              Follow-up:  6/26/24 1:00pm.      Time spent with pt: Total length of time 20 (minutes) of the encounter and more than 50% was spent counseling the patient.    Brittanie Dunn, PharmD    Continue all meds under the continuation of care with the referring provider and clinical pharmacy team.

## 2025-05-29 NOTE — ASSESSMENT & PLAN NOTE
Patient's goal A1c is < 7%.  Is pt at goal? Yes, 6.7%  Patient's SMBGs are at goal (FBG ~100-120 mg/dL)  Rationale for plan: BG well controlled on metformin 500mg BID, Jardiance 25mg daily, Tresiba 26 units once daily and Mounjaro 5mg weekly. Tolerating Mounjaro well without side effects. Given excellent glucose control and history of side effects on Ozempic, will slow down titration of Mounjaro. Plan to increase dose in 1 month.  Medication Changes:  CONTINUE  Mounjaro 5mg once weekly  Jardiance 25 mg once daily  Metformin 500mg BID  Tresiba 26 units when starting Ozempic    Monitoring and Education:  UACR order placed  Titrate insulin, Mounjaro  Educated on administration, MOA, side effects of Mounjaro

## 2025-06-03 ENCOUNTER — PHARMACY VISIT (OUTPATIENT)
Dept: PHARMACY | Facility: CLINIC | Age: 80
End: 2025-06-03
Payer: COMMERCIAL

## 2025-06-05 ENCOUNTER — APPOINTMENT (OUTPATIENT)
Dept: PHARMACY | Facility: HOSPITAL | Age: 80
End: 2025-06-05
Payer: MEDICARE

## 2025-06-15 PROCEDURE — RXMED WILLOW AMBULATORY MEDICATION CHARGE

## 2025-06-18 ENCOUNTER — PHARMACY VISIT (OUTPATIENT)
Dept: PHARMACY | Facility: CLINIC | Age: 80
End: 2025-06-18
Payer: COMMERCIAL

## 2025-06-26 ENCOUNTER — APPOINTMENT (OUTPATIENT)
Dept: PHARMACY | Facility: HOSPITAL | Age: 80
End: 2025-06-26
Payer: MEDICARE

## 2025-06-26 DIAGNOSIS — E11.9 TYPE 2 DIABETES MELLITUS WITHOUT COMPLICATION, WITHOUT LONG-TERM CURRENT USE OF INSULIN: ICD-10-CM

## 2025-06-26 PROCEDURE — RXMED WILLOW AMBULATORY MEDICATION CHARGE

## 2025-06-26 NOTE — PROGRESS NOTES
Patient Assistance for Jardiance, Tresiba, Lantus, Ozempic approved through 9/20/25. Will have to be renewed prior to that date to prevent lapse in coverage. Medication(s) will be received at no cost to patient from [facility].       Patient ID: Saturnino Escobedo is a 80 y.o. male who presents for Diabetes.    Referring Provider: Bud Escalera MD  PCP: Bud Escalera MD Last visit with PCP: 5/8/25 Next visit with PCP: 9/11/25      Subjective   Treatment Adherence:   Patient did take medications today.   Number of missed doses in last 7 days: very seldom twice/month.      Preferred pharmacy: Mail order  Can patient afford prescribed medications: Yes, enrolled in PAP for Jardiance, Tresiba, Lantus, Ozempic, Mounjaro       HPI    Today, patient reports feeling well. Started famotidine, bupropion last month. Thought they were both for depression. Advised that patient can try reducing famotidine to once daily if he thinks transition to Mounjaro stopped heart burn    Getting up much earlier. Has a lot going on. Deck, flowers, fertilizer.     Does not have sensor on. Ran out and next shipment 7/1/25  A few days checking finger stick. Blood test was usually 10-15 points higher  Average went up to 140 Previously 130.   No upset stomach, Nausea. Since really high day, having diarrhea x 4-5 days. Watermelon that same day.   178 pounds  5/22 68  5/22 65  5/10 68  6/4 67  6/4 63  6/3 68,68,69      DIABETES MELLITUS TYPE 2:    Does patient follow with Endocrinology: No  Last optometry exam: couple months ago  Most recent visit in Podiatry: no-- patient denies sores or cuts on feet today. Bottom of feet hurt once in a while. Bought insoles. Not regular.      Current diabetic medications include:  Jardiance 25mg daily   Tresiba 26 units daily  Metformin 500mg BID  Mounjaro 5mg   Lost another 4 pounds- 18 pounds total. Not very hungry at all most of the time.   Last week or so, has been eating even if not hungry.  Heartburn yesterday. 1 hamburger yesterday with no bread.     Adverse Effects: none noted    Past diabetic medications include:  NPH    Starting weight: 196lb  Current weight: 178 lb    Glucose Readings:  Glucometer/CGM Type: Glucometer.   FSL3_  Time in target  Above: 12%  TIR: 88%  Below: 0%    Monday really hot- up to 301. Had not eaten anything.    Any episodes of hypoglycemia? Occasional  Has had episode where all of a sudden low when outside working. Nighttime if anything. Hasn't eaten yet. Feels like jelly.     Advised to use Glucose tablets. Protein meal within an hour in event of hypoglycemia  Does pt have proteinuria? unknown. Needs lab    Lifestyle:  Diet: sleeps late. Does not eat until 12-1pm. Coffee, cottage chese, yogurt. Supper around 7pm. Chicken. Sometimes messes up at night. Cookies, apples and peanut butter.  Physical Activity: golf when warm    Secondary Prevention:  Statin? Yes- rosuvastatin 20mg  ACE-I/ARB? Yes- losartan-hydrochlorothiazide 100-25mg daily  Nees new Rx  Aspirin? Yes    Review of Systems    No history Pancreatitis  No MTC    Objective     There were no vitals taken for this visit.   BP Readings from Last 4 Encounters:   05/08/25 132/84   01/09/25 122/74   01/07/25 131/87   09/12/24 140/80      There were no vitals filed for this visit.     Labs  Lab Results   Component Value Date    BILITOT 0.6 04/24/2025    CALCIUM 9.9 04/24/2025    CO2 26 04/24/2025     04/24/2025    CREATININE 1.16 04/24/2025    GLUCOSE 108 (H) 04/24/2025    ALKPHOS 93 04/24/2025    K 3.9 04/24/2025    PROT 7.6 04/24/2025     04/24/2025    AST 24 04/24/2025    ALT 20 04/24/2025    BUN 28 (H) 04/24/2025    ANIONGAP 15 04/24/2025    ALBUMIN 4.7 04/24/2025    GFRMALE 66 09/25/2023     Lab Results   Component Value Date    TRIG 327 (H) 04/24/2025    CHOL 132 04/24/2025    LDLCALC 58 04/24/2025    HDL 40 04/24/2025     Lab Results   Component Value Date    HGBA1C 7.1 (H) 04/24/2025       Current  "Outpatient Medications   Medication Instructions    amLODIPine (NORVASC) 10 mg, oral, Daily    aspirin 81 mg, Daily    buPROPion XL (WELLBUTRIN XL) 150 mg, oral, Every morning, Do not crush, chew, or split.    DULoxetine (CYMBALTA) 60 mg, oral, Daily    famotidine (PEPCID) 20 mg, oral, 2 times daily    fish oil concentrate (Omega-3) 120-180 mg capsule     Jardiance 25 mg, oral, Daily    Lantus Solostar U-100 Insulin 30 Units, subcutaneous, Nightly, Take as directed per insulin instructions.    latanoprost (Xalatan) 0.005 % ophthalmic solution 1 drop, Nightly    losartan-hydrochlorothiazide (Hyzaar) 100-25 mg tablet 1 tablet, oral, Daily    metFORMIN (GLUCOPHAGE) 500 mg, oral, 2 times daily (morning and late afternoon)    metoprolol tartrate (LOPRESSOR) 50 mg, oral, 2 times daily    Mounjaro 7.5 mg, subcutaneous, Weekly    [START ON 7/8/2025] Mounjaro 10 mg, subcutaneous, Weekly    Mounjaro 5 mg, subcutaneous, Weekly, Compound with B12, titrate upwards as desired.    MULTIVITAMIN ORAL Daily RT    pen needle, diabetic 32 gauge x 5/32\" needle Use to inject insulin once daily as directed    rosuvastatin (CRESTOR) 20 mg, oral, Daily    timoloL maleate 0.5 % drops, once daily 1 drop, 2 times daily         Drug Interactions;  None at time of review    Assessment/Plan   Problem List Items Addressed This Visit           ICD-10-CM    Type 2 diabetes mellitus E11.9    Patient's goal A1c is < 7%.  Is pt at goal? Yes, 7.1%  Patient's SMBGs are at goal (FBG ~100-120 mg/dL)  Rationale for plan: BG well controlled on metformin 500mg BID, Jardiance 25mg daily, Tresiba 26 units once daily and Mounjaro 5mg weekly. Tolerating Mounjaro well minimal side effects. Experiencing diarrhea for the last 5 days. Still losing weight on current Mounjaro dose. Given history of side effects on Ozempic, agreed to stay on Mounjaro 5mg weekly. Slightly reduce Tresiba d/t occasional lows  Medication Changes:  CONTINUE  Mounjaro 5mg once " weekly  Jardiance 25 mg once daily  Metformin 500mg BID  DECREASE  Tresiba 24 units    Monitoring and Education:  Titrate insulin, Mounjaro  Educated on administration, MOA, side effects of Mounjaro           Relevant Orders    Referral to Clinical Pharmacy     Tresiba to 24 units, Mounjaro     Follow-up:  8/14/24 1:00pm.      Time spent with pt: Total length of time 20 (minutes) of the encounter and more than 50% was spent counseling the patient.    Brittanie Dunn, PharmD    Continue all meds under the continuation of care with the referring provider and clinical pharmacy team.

## 2025-06-26 NOTE — ASSESSMENT & PLAN NOTE
Patient's goal A1c is < 7%.  Is pt at goal? Yes, 7.1%  Patient's SMBGs are at goal (FBG ~100-120 mg/dL)  Rationale for plan: BG well controlled on metformin 500mg BID, Jardiance 25mg daily, Tresiba 26 units once daily and Mounjaro 5mg weekly. Tolerating Mounjaro well minimal side effects. Experiencing diarrhea for the last 5 days. Still losing weight on current Mounjaro dose. Given history of side effects on Ozempic, agreed to stay on Mounjaro 5mg weekly. Slightly reduce Tresiba d/t occasional lows  Medication Changes:  CONTINUE  Mounjaro 5mg once weekly  Jardiance 25 mg once daily  Metformin 500mg BID  DECREASE  Tresiba 24 units    Monitoring and Education:  Titrate insulin, Mounjaro  Educated on administration, MOA, side effects of Mounjaro

## 2025-06-27 ENCOUNTER — PHARMACY VISIT (OUTPATIENT)
Dept: PHARMACY | Facility: CLINIC | Age: 80
End: 2025-06-27
Payer: COMMERCIAL

## 2025-07-21 PROCEDURE — RXMED WILLOW AMBULATORY MEDICATION CHARGE

## 2025-07-24 DIAGNOSIS — F33.42 RECURRENT MAJOR DEPRESSIVE DISORDER, IN FULL REMISSION: ICD-10-CM

## 2025-07-24 DIAGNOSIS — I10 PRIMARY HYPERTENSION: ICD-10-CM

## 2025-07-24 RX ORDER — METOPROLOL TARTRATE 50 MG/1
TABLET ORAL
Qty: 180 TABLET | Refills: 0 | OUTPATIENT
Start: 2025-07-24

## 2025-07-24 RX ORDER — AMLODIPINE BESYLATE 10 MG/1
TABLET ORAL
Qty: 90 TABLET | Refills: 0 | OUTPATIENT
Start: 2025-07-24

## 2025-07-24 RX ORDER — DULOXETIN HYDROCHLORIDE 60 MG/1
CAPSULE, DELAYED RELEASE ORAL
Qty: 90 CAPSULE | Refills: 0 | OUTPATIENT
Start: 2025-07-24

## 2025-07-28 ENCOUNTER — PHARMACY VISIT (OUTPATIENT)
Dept: PHARMACY | Facility: CLINIC | Age: 80
End: 2025-07-28
Payer: COMMERCIAL

## 2025-07-29 DIAGNOSIS — I10 PRIMARY HYPERTENSION: ICD-10-CM

## 2025-07-29 RX ORDER — AMLODIPINE BESYLATE 10 MG/1
10 TABLET ORAL DAILY
Qty: 90 TABLET | Refills: 3 | Status: SHIPPED | OUTPATIENT
Start: 2025-07-29

## 2025-07-29 NOTE — TELEPHONE ENCOUNTER
Pt called stating he needs this filled, looks like 12mo went to [facility], pt states he uses [facility]    Next OV 9/11  Pt almost OUT  Pt uses [facility] Thx

## 2025-08-14 ENCOUNTER — APPOINTMENT (OUTPATIENT)
Dept: PHARMACY | Facility: HOSPITAL | Age: 80
End: 2025-08-14
Payer: MEDICARE

## 2025-08-14 DIAGNOSIS — E11.9 TYPE 2 DIABETES MELLITUS WITHOUT COMPLICATION, WITHOUT LONG-TERM CURRENT USE OF INSULIN: ICD-10-CM

## 2025-08-14 PROCEDURE — RXMED WILLOW AMBULATORY MEDICATION CHARGE

## 2025-08-18 ENCOUNTER — PHARMACY VISIT (OUTPATIENT)
Dept: PHARMACY | Facility: CLINIC | Age: 80
End: 2025-08-18
Payer: COMMERCIAL

## 2025-08-18 DIAGNOSIS — I10 PRIMARY HYPERTENSION: ICD-10-CM

## 2025-08-18 RX ORDER — METOPROLOL TARTRATE 50 MG/1
50 TABLET ORAL 2 TIMES DAILY
Qty: 180 TABLET | Refills: 1 | Status: SHIPPED | OUTPATIENT
Start: 2025-08-18

## 2025-09-11 ENCOUNTER — APPOINTMENT (OUTPATIENT)
Dept: PRIMARY CARE | Facility: CLINIC | Age: 80
End: 2025-09-11
Payer: MEDICARE

## 2025-10-09 ENCOUNTER — APPOINTMENT (OUTPATIENT)
Dept: PHARMACY | Facility: HOSPITAL | Age: 80
End: 2025-10-09
Payer: MEDICARE